# Patient Record
Sex: MALE | Race: OTHER | HISPANIC OR LATINO
[De-identification: names, ages, dates, MRNs, and addresses within clinical notes are randomized per-mention and may not be internally consistent; named-entity substitution may affect disease eponyms.]

---

## 2018-10-16 ENCOUNTER — LABORATORY RESULT (OUTPATIENT)
Age: 58
End: 2018-10-16

## 2018-10-16 ENCOUNTER — APPOINTMENT (OUTPATIENT)
Dept: HEMATOLOGY ONCOLOGY | Facility: CLINIC | Age: 58
End: 2018-10-16

## 2018-10-16 VITALS
TEMPERATURE: 96.3 F | WEIGHT: 170 LBS | BODY MASS INDEX: 25.76 KG/M2 | HEIGHT: 68 IN | DIASTOLIC BLOOD PRESSURE: 82 MMHG | SYSTOLIC BLOOD PRESSURE: 130 MMHG | RESPIRATION RATE: 14 BRPM | HEART RATE: 76 BPM

## 2018-10-16 RX ORDER — ADALIMUMAB 40MG/0.8ML
40 KIT SUBCUTANEOUS
Refills: 0 | Status: ACTIVE | COMMUNITY

## 2018-10-16 RX ORDER — OMEPRAZOLE 20 MG/1
20 CAPSULE, DELAYED RELEASE ORAL
Refills: 0 | Status: ACTIVE | COMMUNITY

## 2018-10-17 LAB
FERRITIN SERPL-MCNC: 10 NG/ML
HCT VFR BLD CALC: 36.8 %
HGB BLD-MCNC: 11.3 G/DL
IRON SATN MFR SERPL: 9 %
IRON SERPL-MCNC: 35 UG/DL
MCHC RBC-ENTMCNC: 25.3 PG
MCHC RBC-ENTMCNC: 30.7 G/DL
MCV RBC AUTO: 82.3 FL
PLATELET # BLD AUTO: 290 K/UL
PMV BLD: 9.2 FL
RBC # BLD: 4.47 M/UL
RBC # FLD: 15.5 %
RETICS # AUTO: 1.7 %
RETICS AGGREG/RBC NFR: 75.5 K/UL
TIBC SERPL-MCNC: 370 UG/DL
UIBC SERPL-MCNC: 335 UG/DL
WBC # FLD AUTO: 6.83 K/UL

## 2018-10-19 ENCOUNTER — APPOINTMENT (OUTPATIENT)
Dept: HEMATOLOGY ONCOLOGY | Facility: CLINIC | Age: 58
End: 2018-10-19

## 2018-10-19 VITALS
WEIGHT: 170 LBS | HEIGHT: 68 IN | TEMPERATURE: 97.3 F | RESPIRATION RATE: 14 BRPM | DIASTOLIC BLOOD PRESSURE: 82 MMHG | SYSTOLIC BLOOD PRESSURE: 122 MMHG | BODY MASS INDEX: 25.76 KG/M2 | HEART RATE: 82 BPM

## 2018-10-19 RX ORDER — UREA 10 %
140 (45 FE) LOTION (ML) TOPICAL
Qty: 30 | Refills: 0 | Status: DISCONTINUED | COMMUNITY
Start: 2018-08-27

## 2018-10-19 RX ORDER — ERGOCALCIFEROL 1.25 MG/1
1.25 MG CAPSULE, LIQUID FILLED ORAL
Qty: 4 | Refills: 0 | Status: ACTIVE | COMMUNITY
Start: 2018-08-27

## 2018-10-24 ENCOUNTER — APPOINTMENT (OUTPATIENT)
Dept: INFUSION THERAPY | Facility: CLINIC | Age: 58
End: 2018-10-24

## 2018-10-24 RX ORDER — IRON SUCROSE 20 MG/ML
200 INJECTION, SOLUTION INTRAVENOUS ONCE
Qty: 0 | Refills: 0 | Status: COMPLETED | OUTPATIENT
Start: 2018-10-24 | End: 2018-10-24

## 2018-10-24 RX ADMIN — IRON SUCROSE 220 MILLIGRAM(S): 20 INJECTION, SOLUTION INTRAVENOUS at 16:40

## 2018-10-31 ENCOUNTER — APPOINTMENT (OUTPATIENT)
Dept: INFUSION THERAPY | Facility: CLINIC | Age: 58
End: 2018-10-31

## 2018-10-31 RX ORDER — IRON SUCROSE 20 MG/ML
200 INJECTION, SOLUTION INTRAVENOUS ONCE
Qty: 0 | Refills: 0 | Status: COMPLETED | OUTPATIENT
Start: 2018-10-31 | End: 2018-10-31

## 2018-10-31 RX ADMIN — IRON SUCROSE 220 MILLIGRAM(S): 20 INJECTION, SOLUTION INTRAVENOUS at 16:06

## 2018-11-07 ENCOUNTER — APPOINTMENT (OUTPATIENT)
Dept: INFUSION THERAPY | Facility: CLINIC | Age: 58
End: 2018-11-07

## 2018-11-07 RX ORDER — IRON SUCROSE 20 MG/ML
200 INJECTION, SOLUTION INTRAVENOUS ONCE
Qty: 0 | Refills: 0 | Status: COMPLETED | OUTPATIENT
Start: 2018-11-07 | End: 2018-11-07

## 2018-11-07 RX ADMIN — IRON SUCROSE 220 MILLIGRAM(S): 20 INJECTION, SOLUTION INTRAVENOUS at 16:03

## 2018-11-14 ENCOUNTER — APPOINTMENT (OUTPATIENT)
Dept: INFUSION THERAPY | Facility: CLINIC | Age: 58
End: 2018-11-14

## 2018-11-14 RX ORDER — IRON SUCROSE 20 MG/ML
200 INJECTION, SOLUTION INTRAVENOUS ONCE
Qty: 0 | Refills: 0 | Status: COMPLETED | OUTPATIENT
Start: 2018-11-14 | End: 2018-11-14

## 2018-11-14 RX ADMIN — IRON SUCROSE 220 MILLIGRAM(S): 20 INJECTION, SOLUTION INTRAVENOUS at 08:30

## 2018-11-23 ENCOUNTER — APPOINTMENT (OUTPATIENT)
Dept: INFUSION THERAPY | Facility: CLINIC | Age: 58
End: 2018-11-23

## 2018-11-23 RX ORDER — IRON SUCROSE 20 MG/ML
200 INJECTION, SOLUTION INTRAVENOUS ONCE
Qty: 0 | Refills: 0 | Status: COMPLETED | OUTPATIENT
Start: 2018-11-23 | End: 2018-11-23

## 2018-11-23 RX ADMIN — IRON SUCROSE 220 MILLIGRAM(S): 20 INJECTION, SOLUTION INTRAVENOUS at 16:16

## 2019-01-02 ENCOUNTER — APPOINTMENT (OUTPATIENT)
Dept: HEMATOLOGY ONCOLOGY | Facility: CLINIC | Age: 59
End: 2019-01-02

## 2019-01-02 ENCOUNTER — LABORATORY RESULT (OUTPATIENT)
Age: 59
End: 2019-01-02

## 2019-01-02 LAB
HCT VFR BLD CALC: 41.6 %
HGB BLD-MCNC: 13 G/DL
MCHC RBC-ENTMCNC: 26.1 PG
MCHC RBC-ENTMCNC: 31.3 G/DL
MCV RBC AUTO: 83.4 FL
PLATELET # BLD AUTO: 324 K/UL
PMV BLD: 8.8 FL
RBC # BLD: 4.99 M/UL
RBC # FLD: 14.3 %
RETICS # AUTO: 1 %
RETICS AGGREG/RBC NFR: 47.4 K/UL
WBC # FLD AUTO: 8.74 K/UL

## 2019-01-03 LAB
FERRITIN SERPL-MCNC: 198 NG/ML
IRON SATN MFR SERPL: 23 %
IRON SERPL-MCNC: 57 UG/DL
TIBC SERPL-MCNC: 249 UG/DL
UIBC SERPL-MCNC: 192 UG/DL

## 2019-01-04 ENCOUNTER — OUTPATIENT (OUTPATIENT)
Dept: OUTPATIENT SERVICES | Facility: HOSPITAL | Age: 59
LOS: 1 days | Discharge: HOME | End: 2019-01-04

## 2019-01-04 ENCOUNTER — APPOINTMENT (OUTPATIENT)
Dept: HEMATOLOGY ONCOLOGY | Facility: CLINIC | Age: 59
End: 2019-01-04

## 2019-01-04 VITALS
TEMPERATURE: 97.8 F | HEART RATE: 90 BPM | HEIGHT: 68 IN | SYSTOLIC BLOOD PRESSURE: 129 MMHG | DIASTOLIC BLOOD PRESSURE: 78 MMHG | WEIGHT: 166 LBS | BODY MASS INDEX: 25.16 KG/M2 | RESPIRATION RATE: 14 BRPM

## 2019-01-04 DIAGNOSIS — D50.9 IRON DEFICIENCY ANEMIA, UNSPECIFIED: ICD-10-CM

## 2019-01-04 NOTE — ASSESSMENT
[FreeTextEntry1] : KAT due to crohn's disease\par asymptomatic; hg >11g; ferritin 10. S/p  venofer 200mg x5 infusions (completed 11.23.18) with improvement of hg to >12g and normalizaiton of ferritin and iron saturation. Advised not to take iron pills at this time. WIll reevaluate in 3 months : if hg and iron stores decrease, will restart iron infusions. \par rtc in 2-3 mos; labs before the visit\par

## 2019-01-04 NOTE — HISTORY OF PRESENT ILLNESS
[de-identified] : 59 yo man with Crohn's disease and ankylosing spondylitis has chronic iron deficiency anemia. His hg in Oct'2017 was 12g and in June 2018 11g; S/p 5 doses of venofer completed on 11.23.18.

## 2019-01-04 NOTE — CONSULT LETTER
[Dear  ___] : Dear  [unfilled], [Consult Letter:] : I had the pleasure of evaluating your patient, [unfilled]. [Please see my note below.] : Please see my note below. [Consult Closing:] : Thank you very much for allowing me to participate in the care of this patient.  If you have any questions, please do not hesitate to contact me. [Sincerely,] : Sincerely, [FreeTextEntry2] : Cornelius Gonzales MD\par [facility]\par [phone] PHONE\par  [FreeTextEntry3] : dEis Mckeon DO\par Attending Physician,\par Hematology/ Medical Oncology\par [phone] office\par \par

## 2019-04-15 ENCOUNTER — LABORATORY RESULT (OUTPATIENT)
Age: 59
End: 2019-04-15

## 2019-04-15 ENCOUNTER — APPOINTMENT (OUTPATIENT)
Dept: HEMATOLOGY ONCOLOGY | Facility: CLINIC | Age: 59
End: 2019-04-15

## 2019-04-17 ENCOUNTER — APPOINTMENT (OUTPATIENT)
Dept: HEMATOLOGY ONCOLOGY | Facility: CLINIC | Age: 59
End: 2019-04-17

## 2019-04-17 VITALS
BODY MASS INDEX: 25.76 KG/M2 | WEIGHT: 170 LBS | HEIGHT: 68 IN | SYSTOLIC BLOOD PRESSURE: 126 MMHG | RESPIRATION RATE: 18 BRPM | DIASTOLIC BLOOD PRESSURE: 84 MMHG | TEMPERATURE: 97.2 F | HEART RATE: 75 BPM

## 2019-04-17 LAB
FERRITIN SERPL-MCNC: 58 NG/ML
HCT VFR BLD CALC: 37.3 %
HGB BLD-MCNC: 11.9 G/DL
IRON SATN MFR SERPL: 18 %
IRON SERPL-MCNC: 50 UG/DL
MCHC RBC-ENTMCNC: 27.5 PG
MCHC RBC-ENTMCNC: 31.9 G/DL
MCV RBC AUTO: 86.1 FL
PLATELET # BLD AUTO: 273 K/UL
PMV BLD: 8.5 FL
RBC # BLD: 4.33 M/UL
RBC # FLD: 13.3 %
RETICS # AUTO: 1.6 %
RETICS AGGREG/RBC NFR: 70.1 K/UL
TIBC SERPL-MCNC: 272 UG/DL
UIBC SERPL-MCNC: 222 UG/DL
WBC # FLD AUTO: 5.6 K/UL

## 2019-04-17 NOTE — CONSULT LETTER
[Consult Letter:] : I had the pleasure of evaluating your patient, [unfilled]. [Dear  ___] : Dear  [unfilled], [Consult Closing:] : Thank you very much for allowing me to participate in the care of this patient.  If you have any questions, please do not hesitate to contact me. [Please see my note below.] : Please see my note below. [FreeTextEntry2] : Cornelius Gonzales MD\par [facility]\par [phone] PHONE\par  [Sincerely,] : Sincerely, [FreeTextEntry3] : Edis Mckeon DO\par Attending Physician,\par Hematology/ Medical Oncology\par [phone] office\par \par

## 2019-04-17 NOTE — HISTORY OF PRESENT ILLNESS
[de-identified] : 59 yo man with Crohn's disease and ankylosing spondylitis has chronic iron deficiency anemia. His hg in Oct'2017 was 12g and in June 2018 11g; S/p 5 doses of venofer completed on 11.23.18.

## 2019-04-17 NOTE — ASSESSMENT
[FreeTextEntry1] : KAT due to crohn's disease\par asymptomatic; hg >11g; ferritin 58 (April 2019). S/p  venofer 200mg x5 infusions (completed 11.23.18) with improvement of hg to >12g and normalizaiton of ferritin and iron saturation. Advised not to take iron pills at this time. WIll reevaluate in 2 months : if hg and iron stores decrease, will restart iron infusions. \par \par

## 2019-06-10 ENCOUNTER — APPOINTMENT (OUTPATIENT)
Dept: HEMATOLOGY ONCOLOGY | Facility: CLINIC | Age: 59
End: 2019-06-10

## 2019-06-10 ENCOUNTER — LABORATORY RESULT (OUTPATIENT)
Age: 59
End: 2019-06-10

## 2019-06-11 LAB
FERRITIN SERPL-MCNC: 34 NG/ML
HCT VFR BLD CALC: 38.8 %
HGB BLD-MCNC: 12 G/DL
IRON SATN MFR SERPL: 17 %
IRON SERPL-MCNC: 55 UG/DL
MCHC RBC-ENTMCNC: 26.8 PG
MCHC RBC-ENTMCNC: 30.9 G/DL
MCV RBC AUTO: 86.6 FL
PLATELET # BLD AUTO: 268 K/UL
PMV BLD: 9 FL
RBC # BLD: 4.48 M/UL
RBC # FLD: 14 %
TIBC SERPL-MCNC: 322 UG/DL
UIBC SERPL-MCNC: 267 UG/DL
WBC # FLD AUTO: 6.19 K/UL

## 2019-06-12 ENCOUNTER — OUTPATIENT (OUTPATIENT)
Dept: OUTPATIENT SERVICES | Facility: HOSPITAL | Age: 59
LOS: 1 days | Discharge: HOME | End: 2019-06-12

## 2019-06-12 ENCOUNTER — APPOINTMENT (OUTPATIENT)
Dept: HEMATOLOGY ONCOLOGY | Facility: CLINIC | Age: 59
End: 2019-06-12
Payer: COMMERCIAL

## 2019-06-12 VITALS
HEIGHT: 68 IN | RESPIRATION RATE: 16 BRPM | WEIGHT: 170 LBS | DIASTOLIC BLOOD PRESSURE: 86 MMHG | SYSTOLIC BLOOD PRESSURE: 126 MMHG | HEART RATE: 72 BPM | BODY MASS INDEX: 25.76 KG/M2 | TEMPERATURE: 96.1 F

## 2019-06-12 DIAGNOSIS — D50.9 IRON DEFICIENCY ANEMIA, UNSPECIFIED: ICD-10-CM

## 2019-06-12 PROCEDURE — 99213 OFFICE O/P EST LOW 20 MIN: CPT

## 2019-06-12 NOTE — REVIEW OF SYSTEMS
[Negative] : Allergic/Immunologic [Fatigue] : no fatigue [Chest Pain] : no chest pain [Shortness Of Breath] : no shortness of breath [FreeTextEntry7] : no bleeding

## 2019-06-12 NOTE — HISTORY OF PRESENT ILLNESS
[de-identified] : 6/12/19\par He is here for follow-up of KAT.  He completed venofer x 5 in 11/2018.  Most recent CBC is stable with Hgb 12.0g/dL, ferritin 34 and ironsat 17%.  He denies any CP, SOB, fatigue or bleeding.   [de-identified] : 57 yo man with Crohn's disease and ankylosing spondylitis has chronic iron deficiency anemia. His hg in Oct'2017 was 12g and in June 2018 11g; S/p 5 doses of venofer completed on 11.23.18.

## 2019-06-12 NOTE — CONSULT LETTER
[Dear  ___] : Dear  [unfilled], [Please see my note below.] : Please see my note below. [Consult Letter:] : I had the pleasure of evaluating your patient, [unfilled]. [Sincerely,] : Sincerely, [Consult Closing:] : Thank you very much for allowing me to participate in the care of this patient.  If you have any questions, please do not hesitate to contact me. [FreeTextEntry2] : Cornelius Gonzales MD\par [facility]\par [phone] PHONE\par  [FreeTextEntry3] : Edis Mckeon DO\par Attending Physician,\par Hematology/ Medical Oncology\par [phone] office\par \par

## 2019-06-12 NOTE — ASSESSMENT
[FreeTextEntry1] : #KAT due to crohn's disease; asymptomatic; hg >11g; ferritin 58 (April 2019). \par - Advised not to take iron pills at this time. \par - S/p venofer 200mg x5 infusions (completed 11.23.18) with improvement of hg to >12g and normalization of ferritin and iron saturation.\par - if hgb and iron stores decrease, will restart iron infusions. \par \par RTC in 2 months, CBC/iron studies/ferritin/retic count 2 days prior to visit\par

## 2019-07-22 ENCOUNTER — LABORATORY RESULT (OUTPATIENT)
Age: 59
End: 2019-07-22

## 2019-07-22 ENCOUNTER — APPOINTMENT (OUTPATIENT)
Dept: HEMATOLOGY ONCOLOGY | Facility: CLINIC | Age: 59
End: 2019-07-22

## 2019-07-22 LAB
HCT VFR BLD CALC: 39.1 %
HGB BLD-MCNC: 12.1 G/DL
MCHC RBC-ENTMCNC: 26.6 PG
MCHC RBC-ENTMCNC: 30.9 G/DL
MCV RBC AUTO: 85.9 FL
PLATELET # BLD AUTO: 266 K/UL
PMV BLD: 9.1 FL
RBC # BLD: 4.55 M/UL
RBC # FLD: 13.9 %
RETICS # AUTO: 1.8 %
RETICS AGGREG/RBC NFR: 81 K/UL
WBC # FLD AUTO: 6.92 K/UL

## 2019-07-23 LAB
FERRITIN SERPL-MCNC: 16 NG/ML
IRON SATN MFR SERPL: 11 %
IRON SERPL-MCNC: 40 UG/DL
TIBC SERPL-MCNC: 375 UG/DL
UIBC SERPL-MCNC: 335 UG/DL

## 2019-07-24 ENCOUNTER — APPOINTMENT (OUTPATIENT)
Dept: HEMATOLOGY ONCOLOGY | Facility: CLINIC | Age: 59
End: 2019-07-24
Payer: COMMERCIAL

## 2019-07-24 VITALS
BODY MASS INDEX: 25.61 KG/M2 | RESPIRATION RATE: 16 BRPM | WEIGHT: 169 LBS | SYSTOLIC BLOOD PRESSURE: 138 MMHG | HEART RATE: 75 BPM | TEMPERATURE: 97.2 F | HEIGHT: 68 IN | DIASTOLIC BLOOD PRESSURE: 80 MMHG

## 2019-07-24 PROCEDURE — 99213 OFFICE O/P EST LOW 20 MIN: CPT

## 2019-07-24 NOTE — HISTORY OF PRESENT ILLNESS
[de-identified] : 6/12/19\par He is here for follow-up of KAT.  He completed venofer x 5 in 11/2018.  Most recent CBC is stable with Hgb 12.0g/dL, ferritin 34 and ironsat 17%.  He denies any CP, SOB, fatigue or bleeding.  \par \par 7/24/19\par Patient is here for a follow-up visit of KAT.   [de-identified] : 57 yo man with Crohn's disease and ankylosing spondylitis has chronic iron deficiency anemia. His hg in Oct'2017 was 12g and in June 2018 11g; S/p 5 doses of venofer completed on 11.23.18.

## 2019-07-24 NOTE — ASSESSMENT
[FreeTextEntry1] : #KAT due to crohn's disease; asymptomatic; hg >11g; ferritin decreased; . \par - Advised not to take iron pills at this time. \par - S/p venofer 200mg x5 infusions (completed 11.23.18) with improvement of hg to >12g and normalization of ferritin and iron saturation. However, in view of the decrease of iron stores recommend to restart venofer weekly x5\par

## 2019-07-24 NOTE — CONSULT LETTER
[Consult Letter:] : I had the pleasure of evaluating your patient, [unfilled]. [Dear  ___] : Dear  [unfilled], [Please see my note below.] : Please see my note below. [Consult Closing:] : Thank you very much for allowing me to participate in the care of this patient.  If you have any questions, please do not hesitate to contact me. [Sincerely,] : Sincerely, [FreeTextEntry2] : Cornelius Gonzales MD\par [facility]\par [phone] PHONE\par  [FreeTextEntry3] : Edis Mckeon DO\par Attending Physician,\par Hematology/ Medical Oncology\par [phone] office\par \par

## 2019-07-24 NOTE — REVIEW OF SYSTEMS
[Negative] : Heme/Lymph [Fatigue] : no fatigue [Shortness Of Breath] : no shortness of breath [Chest Pain] : no chest pain [FreeTextEntry7] : no bleeding

## 2019-07-30 ENCOUNTER — APPOINTMENT (OUTPATIENT)
Dept: INFUSION THERAPY | Facility: CLINIC | Age: 59
End: 2019-07-30

## 2019-07-30 RX ORDER — IRON SUCROSE 20 MG/ML
200 INJECTION, SOLUTION INTRAVENOUS ONCE
Refills: 0 | Status: COMPLETED | OUTPATIENT
Start: 2019-07-30 | End: 2019-07-30

## 2019-07-30 RX ADMIN — IRON SUCROSE 200 MILLIGRAM(S): 20 INJECTION, SOLUTION INTRAVENOUS at 15:55

## 2019-07-30 RX ADMIN — IRON SUCROSE 220 MILLIGRAM(S): 20 INJECTION, SOLUTION INTRAVENOUS at 15:25

## 2019-08-05 ENCOUNTER — APPOINTMENT (OUTPATIENT)
Dept: INFUSION THERAPY | Facility: CLINIC | Age: 59
End: 2019-08-05

## 2019-08-05 RX ORDER — IRON SUCROSE 20 MG/ML
200 INJECTION, SOLUTION INTRAVENOUS ONCE
Refills: 0 | Status: COMPLETED | OUTPATIENT
Start: 2019-08-05 | End: 2019-08-05

## 2019-08-05 RX ADMIN — IRON SUCROSE 220 MILLIGRAM(S): 20 INJECTION, SOLUTION INTRAVENOUS at 16:27

## 2019-08-12 ENCOUNTER — APPOINTMENT (OUTPATIENT)
Dept: INFUSION THERAPY | Facility: CLINIC | Age: 59
End: 2019-08-12

## 2019-08-12 RX ORDER — IRON SUCROSE 20 MG/ML
200 INJECTION, SOLUTION INTRAVENOUS ONCE
Refills: 0 | Status: COMPLETED | OUTPATIENT
Start: 2019-08-12 | End: 2019-08-12

## 2019-08-12 RX ADMIN — IRON SUCROSE 220 MILLIGRAM(S): 20 INJECTION, SOLUTION INTRAVENOUS at 15:25

## 2019-08-12 RX ADMIN — IRON SUCROSE 200 MILLIGRAM(S): 20 INJECTION, SOLUTION INTRAVENOUS at 15:55

## 2019-08-19 ENCOUNTER — APPOINTMENT (OUTPATIENT)
Dept: INFUSION THERAPY | Facility: CLINIC | Age: 59
End: 2019-08-19

## 2019-08-19 RX ORDER — IRON SUCROSE 20 MG/ML
200 INJECTION, SOLUTION INTRAVENOUS ONCE
Refills: 0 | Status: COMPLETED | OUTPATIENT
Start: 2019-08-19 | End: 2019-08-19

## 2019-08-19 RX ADMIN — IRON SUCROSE 220 MILLIGRAM(S): 20 INJECTION, SOLUTION INTRAVENOUS at 15:25

## 2019-08-19 RX ADMIN — IRON SUCROSE 200 MILLIGRAM(S): 20 INJECTION, SOLUTION INTRAVENOUS at 16:10

## 2019-08-26 ENCOUNTER — APPOINTMENT (OUTPATIENT)
Dept: INFUSION THERAPY | Facility: CLINIC | Age: 59
End: 2019-08-26

## 2019-08-26 RX ORDER — IRON SUCROSE 20 MG/ML
200 INJECTION, SOLUTION INTRAVENOUS ONCE
Refills: 0 | Status: DISCONTINUED | OUTPATIENT
Start: 2019-08-26 | End: 2020-01-03

## 2019-09-26 ENCOUNTER — OUTPATIENT (OUTPATIENT)
Dept: OUTPATIENT SERVICES | Facility: HOSPITAL | Age: 59
LOS: 1 days | Discharge: HOME | End: 2019-09-26

## 2019-09-26 ENCOUNTER — LABORATORY RESULT (OUTPATIENT)
Age: 59
End: 2019-09-26

## 2019-09-26 ENCOUNTER — APPOINTMENT (OUTPATIENT)
Dept: HEMATOLOGY ONCOLOGY | Facility: CLINIC | Age: 59
End: 2019-09-26

## 2019-09-26 DIAGNOSIS — Z00.00 ENCOUNTER FOR GENERAL ADULT MEDICAL EXAMINATION W/OUT ABNORMAL FINDINGS: ICD-10-CM

## 2019-09-26 DIAGNOSIS — D50.9 IRON DEFICIENCY ANEMIA, UNSPECIFIED: ICD-10-CM

## 2019-09-27 LAB
FERRITIN SERPL-MCNC: 108 NG/ML
HCT VFR BLD CALC: 39.9 %
HGB BLD-MCNC: 12.8 G/DL
IRON SATN MFR SERPL: 24 %
IRON SERPL-MCNC: 62 UG/DL
MCHC RBC-ENTMCNC: 27.5 PG
MCHC RBC-ENTMCNC: 32.1 G/DL
MCV RBC AUTO: 85.8 FL
PLATELET # BLD AUTO: 208 K/UL
PMV BLD: 8.5 FL
RBC # BLD: 4.65 M/UL
RBC # FLD: 13.5 %
RETICS # AUTO: 1.2 %
RETICS AGGREG/RBC NFR: 54.9 K/UL
TIBC SERPL-MCNC: 260 UG/DL
UIBC SERPL-MCNC: 198 UG/DL
WBC # FLD AUTO: 5.41 K/UL

## 2019-10-01 ENCOUNTER — APPOINTMENT (OUTPATIENT)
Dept: HEMATOLOGY ONCOLOGY | Facility: CLINIC | Age: 59
End: 2019-10-01

## 2019-11-01 ENCOUNTER — APPOINTMENT (OUTPATIENT)
Dept: HEMATOLOGY ONCOLOGY | Facility: CLINIC | Age: 59
End: 2019-11-01
Payer: COMMERCIAL

## 2019-11-01 VITALS
DIASTOLIC BLOOD PRESSURE: 77 MMHG | RESPIRATION RATE: 16 BRPM | TEMPERATURE: 96.3 F | HEIGHT: 68 IN | HEART RATE: 89 BPM | BODY MASS INDEX: 26.22 KG/M2 | SYSTOLIC BLOOD PRESSURE: 136 MMHG | WEIGHT: 173 LBS

## 2019-11-01 PROCEDURE — 99213 OFFICE O/P EST LOW 20 MIN: CPT

## 2019-11-01 NOTE — ASSESSMENT
[FreeTextEntry1] : #KAT due to crohn's disease; asymptomatic; hg >11g; ferritin decreased; . \par - Advised not to take iron pills at this time. \par - S/p venofer 200mg x5 infusions (completed 11.23.18) and second course finished in August 2019  with improvement of hg to >12g and normalization of ferritin and iron saturation. \par \par rtc/labs in 4 mos

## 2019-11-01 NOTE — HISTORY OF PRESENT ILLNESS
[de-identified] : 6/12/19\par He is here for follow-up of KAT.  He completed venofer x 5 in 11/2018.  Most recent CBC is stable with Hgb 12.0g/dL, ferritin 34 and ironsat 17%.  He denies any CP, SOB, fatigue or bleeding.  \par \par 7/24/19\par Patient is here for a follow-up visit of KAT.   [de-identified] : 57 yo man with Crohn's disease and ankylosing spondylitis has chronic iron deficiency anemia. His hg in Oct'2017 was 12g and in June 2018 11g; S/p 5 doses of venofer completed on 11.23.18.

## 2019-11-01 NOTE — CONSULT LETTER
[Dear  ___] : Dear  [unfilled], [Consult Letter:] : I had the pleasure of evaluating your patient, [unfilled]. [Please see my note below.] : Please see my note below. [Consult Closing:] : Thank you very much for allowing me to participate in the care of this patient.  If you have any questions, please do not hesitate to contact me. [Sincerely,] : Sincerely, [FreeTextEntry2] : Cornelius Gonzales MD\par [facility]\par [phone] PHONE\par  [FreeTextEntry3] : Edis Mckeon DO\par Attending Physician,\par Hematology/ Medical Oncology\par [phone] office\par \par

## 2019-12-27 ENCOUNTER — LABORATORY RESULT (OUTPATIENT)
Age: 59
End: 2019-12-27

## 2019-12-27 ENCOUNTER — APPOINTMENT (OUTPATIENT)
Dept: HEMATOLOGY ONCOLOGY | Facility: CLINIC | Age: 59
End: 2019-12-27

## 2019-12-27 LAB
HCT VFR BLD CALC: 37.2 %
HGB BLD-MCNC: 12 G/DL
MCHC RBC-ENTMCNC: 27.7 PG
MCHC RBC-ENTMCNC: 32.3 G/DL
MCV RBC AUTO: 85.9 FL
PLATELET # BLD AUTO: 245 K/UL
PMV BLD: 8.6 FL
RBC # BLD: 4.33 M/UL
RBC # FLD: 13.2 %
WBC # FLD AUTO: 7.32 K/UL

## 2019-12-30 LAB
FERRITIN SERPL-MCNC: 127 NG/ML
IRON SATN MFR SERPL: 17 %
IRON SERPL-MCNC: 46 UG/DL
TIBC SERPL-MCNC: 275 UG/DL
UIBC SERPL-MCNC: 229 UG/DL

## 2020-01-14 ENCOUNTER — APPOINTMENT (OUTPATIENT)
Dept: HEMATOLOGY ONCOLOGY | Facility: CLINIC | Age: 60
End: 2020-01-14
Payer: COMMERCIAL

## 2020-01-14 ENCOUNTER — OUTPATIENT (OUTPATIENT)
Dept: OUTPATIENT SERVICES | Facility: HOSPITAL | Age: 60
LOS: 1 days | Discharge: HOME | End: 2020-01-14

## 2020-01-14 VITALS
TEMPERATURE: 97 F | BODY MASS INDEX: 26.67 KG/M2 | WEIGHT: 176 LBS | HEIGHT: 68 IN | DIASTOLIC BLOOD PRESSURE: 73 MMHG | HEART RATE: 71 BPM | SYSTOLIC BLOOD PRESSURE: 130 MMHG | RESPIRATION RATE: 16 BRPM

## 2020-01-14 DIAGNOSIS — D50.9 IRON DEFICIENCY ANEMIA, UNSPECIFIED: ICD-10-CM

## 2020-01-14 PROCEDURE — 99213 OFFICE O/P EST LOW 20 MIN: CPT

## 2020-01-14 NOTE — ASSESSMENT
[FreeTextEntry1] : #KAT due to crohn's disease; asymptomatic; hg >11g; ferritin decreased; . \par - Advised not to take iron pills at this time. \par - S/p venofer 200mg x5 infusions (completed 11.23.18) and second course finished in August 2019  with improvement of hg to >12g and normalization of ferritin and iron saturation. normal levels\par \par has ulcerative colitis; needs to see gi to schedule surveillance colonoscopy\par \par rtc/labs in 4 mos

## 2020-01-14 NOTE — REVIEW OF SYSTEMS
[Negative] : Heme/Lymph [Fatigue] : no fatigue [Chest Pain] : no chest pain [Shortness Of Breath] : no shortness of breath [FreeTextEntry7] : no bleeding

## 2020-01-14 NOTE — HISTORY OF PRESENT ILLNESS
[de-identified] : 6/12/19\par He is here for follow-up of KAT.  He completed venofer x 5 in 11/2018.  Most recent CBC is stable with Hgb 12.0g/dL, ferritin 34 and ironsat 17%.  He denies any CP, SOB, fatigue or bleeding.  \par \par 7/24/19\par Patient is here for a follow-up visit of KAT.   [de-identified] : 57 yo man with Crohn's disease and ankylosing spondylitis has chronic iron deficiency anemia. His hg in Oct'2017 was 12g and in June 2018 11g; S/p 5 doses of venofer completed on 11.23.18.

## 2020-01-14 NOTE — CONSULT LETTER
[Consult Letter:] : I had the pleasure of evaluating your patient, [unfilled]. [Dear  ___] : Dear  [unfilled], [Sincerely,] : Sincerely, [Consult Closing:] : Thank you very much for allowing me to participate in the care of this patient.  If you have any questions, please do not hesitate to contact me. [Please see my note below.] : Please see my note below. [DrSekou  ___] : Dr. BECERRA [FreeTextEntry3] : Edis Mckeon DO\par Attending Physician,\par Hematology/ Medical Oncology\par [phone] office\par \par  [FreeTextEntry2] : Cornelius Gonzales MD\par [facility]\par [phone] PHONE\par

## 2020-05-11 ENCOUNTER — APPOINTMENT (OUTPATIENT)
Dept: HEMATOLOGY ONCOLOGY | Facility: CLINIC | Age: 60
End: 2020-05-11

## 2020-06-08 ENCOUNTER — LABORATORY RESULT (OUTPATIENT)
Age: 60
End: 2020-06-08

## 2020-06-08 ENCOUNTER — APPOINTMENT (OUTPATIENT)
Dept: HEMATOLOGY ONCOLOGY | Facility: CLINIC | Age: 60
End: 2020-06-08

## 2020-06-16 ENCOUNTER — APPOINTMENT (OUTPATIENT)
Dept: HEMATOLOGY ONCOLOGY | Facility: CLINIC | Age: 60
End: 2020-06-16
Payer: COMMERCIAL

## 2020-06-16 LAB
ANION GAP SERPL CALC-SCNC: 12 MMOL/L
BUN SERPL-MCNC: 12 MG/DL
CALCIUM SERPL-MCNC: 8.3 MG/DL
CHLORIDE SERPL-SCNC: 102 MMOL/L
CO2 SERPL-SCNC: 30 MMOL/L
CREAT SERPL-MCNC: 1 MG/DL
FERRITIN SERPL-MCNC: 277 NG/ML
GLUCOSE SERPL-MCNC: 101 MG/DL
HCT VFR BLD CALC: 34.8 %
HGB BLD-MCNC: 11 G/DL
IRON SATN MFR SERPL: 32 %
IRON SERPL-MCNC: 67 UG/DL
MCHC RBC-ENTMCNC: 27.2 PG
MCHC RBC-ENTMCNC: 31.6 G/DL
MCV RBC AUTO: 85.9 FL
PLATELET # BLD AUTO: 300 K/UL
PMV BLD: 8.6 FL
POTASSIUM SERPL-SCNC: 3.3 MMOL/L
RBC # BLD: 4.05 M/UL
RBC # FLD: 12.9 %
RETICS # AUTO: 1.2 %
RETICS AGGREG/RBC NFR: 47 K/UL
SODIUM SERPL-SCNC: 144 MMOL/L
TIBC SERPL-MCNC: 210 UG/DL
UIBC SERPL-MCNC: 143 UG/DL
WBC # FLD AUTO: 5.68 K/UL

## 2020-06-16 PROCEDURE — 99213 OFFICE O/P EST LOW 20 MIN: CPT | Mod: 95

## 2020-06-16 NOTE — CONSULT LETTER
[Dear  ___] : Dear  [unfilled], [Consult Letter:] : I had the pleasure of evaluating your patient, [unfilled]. [Please see my note below.] : Please see my note below. [Sincerely,] : Sincerely, [Consult Closing:] : Thank you very much for allowing me to participate in the care of this patient.  If you have any questions, please do not hesitate to contact me. [DrSekou  ___] : Dr. BECERRA [FreeTextEntry3] : Edis Mckeon DO\par Attending Physician,\par Hematology/ Medical Oncology\par [phone] office\par \par  [FreeTextEntry2] : Cornelius Gonzales MD\par [facility] Services\par [phone] PHONE\par

## 2020-06-16 NOTE — HISTORY OF PRESENT ILLNESS
[de-identified] : 6/12/19\par He is here for follow-up of KAT.  He completed venofer x 5 in 11/2018.  Most recent CBC is stable with Hgb 12.0g/dL, ferritin 34 and ironsat 17%.  He denies any CP, SOB, fatigue or bleeding.  \par \par 7/24/19\par Patient is here for a follow-up visit of KAT.   [de-identified] : 57 yo man with Crohn's disease and ankylosing spondylitis has chronic iron deficiency anemia. His hg in Oct'2017 was 12g and in June 2018 11g; S/p 5 doses of venofer completed on 11.23.18.

## 2020-06-16 NOTE — ASSESSMENT
[FreeTextEntry1] : #KAT due to crohn's disease; asymptomatic; hg >11g; ferritin decreased; . \par - Advised not to take iron pills at this time. \par - S/p venofer 200mg x5 infusions (completed 11.23.18) and second course finished in August 2019  with improvement of hg to >12g and normalization of ferritin and iron saturation. normal levels\par \par has ulcerative colitis; needs to see gi to schedule surveillance colonoscopy\par \par still has excellent iron stores and low normal hg of 11g; do not recommend iron at this time\par rt/labs in 3 mos

## 2020-09-08 ENCOUNTER — APPOINTMENT (OUTPATIENT)
Dept: HEMATOLOGY ONCOLOGY | Facility: CLINIC | Age: 60
End: 2020-09-08

## 2020-09-08 ENCOUNTER — LABORATORY RESULT (OUTPATIENT)
Age: 60
End: 2020-09-08

## 2020-09-08 ENCOUNTER — OUTPATIENT (OUTPATIENT)
Dept: OUTPATIENT SERVICES | Facility: HOSPITAL | Age: 60
LOS: 1 days | Discharge: HOME | End: 2020-09-08

## 2020-09-08 DIAGNOSIS — D50.9 IRON DEFICIENCY ANEMIA, UNSPECIFIED: ICD-10-CM

## 2020-09-15 ENCOUNTER — APPOINTMENT (OUTPATIENT)
Dept: HEMATOLOGY ONCOLOGY | Facility: CLINIC | Age: 60
End: 2020-09-15
Payer: COMMERCIAL

## 2020-09-15 LAB
ALBUMIN SERPL ELPH-MCNC: 3.7 G/DL
ALP BLD-CCNC: 60 U/L
ALT SERPL-CCNC: 10 U/L
ANION GAP SERPL CALC-SCNC: 9 MMOL/L
AST SERPL-CCNC: 18 U/L
BILIRUB DIRECT SERPL-MCNC: <0.2 MG/DL
BILIRUB INDIRECT SERPL-MCNC: >0.4 MG/DL
BILIRUB SERPL-MCNC: 0.6 MG/DL
BUN SERPL-MCNC: 11 MG/DL
CALCIUM SERPL-MCNC: 9 MG/DL
CHLORIDE SERPL-SCNC: 102 MMOL/L
CO2 SERPL-SCNC: 32 MMOL/L
CREAT SERPL-MCNC: 0.9 MG/DL
FERRITIN SERPL-MCNC: 85 NG/ML
GLUCOSE SERPL-MCNC: 94 MG/DL
HCT VFR BLD CALC: 42.8 %
HGB BLD-MCNC: 13.5 G/DL
IRON SATN MFR SERPL: 50 %
IRON SERPL-MCNC: 155 UG/DL
MCHC RBC-ENTMCNC: 27.3 PG
MCHC RBC-ENTMCNC: 31.5 G/DL
MCV RBC AUTO: 86.6 FL
PLATELET # BLD AUTO: 269 K/UL
PMV BLD: 8.7 FL
POTASSIUM SERPL-SCNC: 3.9 MMOL/L
PROT SERPL-MCNC: 6.7 G/DL
RBC # BLD: 4.94 M/UL
RBC # FLD: 13.6 %
SODIUM SERPL-SCNC: 143 MMOL/L
TIBC SERPL-MCNC: 309 UG/DL
UIBC SERPL-MCNC: 154 UG/DL
WBC # FLD AUTO: 5.54 K/UL

## 2020-09-15 PROCEDURE — 99213 OFFICE O/P EST LOW 20 MIN: CPT | Mod: 95

## 2020-09-15 NOTE — REASON FOR VISIT
[Follow-Up Visit] : a follow-up [Home] : at home, [unfilled] , at the time of the visit. [Medical Office: ([facility])___] : at the medical office located in  [Verbal consent obtained from patient] : the patient, [unfilled] [FreeTextEntry2] : KAT

## 2020-09-15 NOTE — HISTORY OF PRESENT ILLNESS
[de-identified] : 6/12/19\par He is here for follow-up of KAT.  He completed venofer x 5 in 11/2018.  Most recent CBC is stable with Hgb 12.0g/dL, ferritin 34 and ironsat 17%.  He denies any CP, SOB, fatigue or bleeding.  \par \par 7/24/19\par Patient is here for a follow-up visit of KAT.   [de-identified] : 57 yo man with Crohn's disease and ankylosing spondylitis has chronic iron deficiency anemia. His hg in Oct'2017 was 12g and in June 2018 11g; S/p 5 doses of venofer completed on 11.23.18.

## 2020-09-15 NOTE — CONSULT LETTER
[Dear  ___] : Dear  [unfilled], [Consult Letter:] : I had the pleasure of evaluating your patient, [unfilled]. [Please see my note below.] : Please see my note below. [Consult Closing:] : Thank you very much for allowing me to participate in the care of this patient.  If you have any questions, please do not hesitate to contact me. [Sincerely,] : Sincerely, [DrSekou  ___] : Dr. BECERRA [FreeTextEntry2] : Cornelius Gonzales MD\par [facility]\par [phone] PHONE\par  [FreeTextEntry3] : Edis Mckeon DO\par Attending Physician,\par Hematology/ Medical Oncology\par [phone] office\par \par

## 2020-09-15 NOTE — ASSESSMENT
[FreeTextEntry1] : #KAT due to crohn's disease; asymptomatic; hg >11g; ferritin decreased; . \par - Advised not to take iron pills at this time. \par - S/p venofer 200mg x5 infusions (completed 11.23.18) and second course finished in August 2019  with improvement of hg to >12g and normalization of ferritin and iron saturation. normal levels\par \par has ulcerative colitis; needs to see gi to schedule surveillance colonoscopy\par \par still has excellent iron stores and low normal hg of 13,5g; do not recommend iron at this time\par rt/labs in 3 mos

## 2020-12-07 ENCOUNTER — APPOINTMENT (OUTPATIENT)
Dept: HEMATOLOGY ONCOLOGY | Facility: CLINIC | Age: 60
End: 2020-12-07

## 2020-12-16 ENCOUNTER — APPOINTMENT (OUTPATIENT)
Dept: HEMATOLOGY ONCOLOGY | Facility: CLINIC | Age: 60
End: 2020-12-16

## 2020-12-18 ENCOUNTER — LABORATORY RESULT (OUTPATIENT)
Age: 60
End: 2020-12-18

## 2020-12-18 ENCOUNTER — APPOINTMENT (OUTPATIENT)
Dept: HEMATOLOGY ONCOLOGY | Facility: CLINIC | Age: 60
End: 2020-12-18

## 2020-12-18 ENCOUNTER — OUTPATIENT (OUTPATIENT)
Dept: OUTPATIENT SERVICES | Facility: HOSPITAL | Age: 60
LOS: 1 days | Discharge: HOME | End: 2020-12-18

## 2020-12-18 LAB
HCT VFR BLD CALC: 42.6 %
HGB BLD-MCNC: 13.7 G/DL
IRON SATN MFR SERPL: 31 %
IRON SERPL-MCNC: 95 UG/DL
MCHC RBC-ENTMCNC: 27.7 PG
MCHC RBC-ENTMCNC: 32.2 G/DL
MCV RBC AUTO: 86.1 FL
PLATELET # BLD AUTO: 247 K/UL
PMV BLD: 8.9 FL
RBC # BLD: 4.95 M/UL
RBC # FLD: 13 %
TIBC SERPL-MCNC: 309 UG/DL
UIBC SERPL-MCNC: 214 UG/DL
WBC # FLD AUTO: 7.22 K/UL

## 2020-12-19 LAB — FERRITIN SERPL-MCNC: 61 NG/ML

## 2020-12-23 ENCOUNTER — APPOINTMENT (OUTPATIENT)
Dept: HEMATOLOGY ONCOLOGY | Facility: CLINIC | Age: 60
End: 2020-12-23
Payer: COMMERCIAL

## 2020-12-23 PROCEDURE — 99213 OFFICE O/P EST LOW 20 MIN: CPT | Mod: 95

## 2020-12-23 NOTE — CONSULT LETTER
[Dear  ___] : Dear  [unfilled], [Consult Letter:] : I had the pleasure of evaluating your patient, [unfilled]. [Please see my note below.] : Please see my note below. [Consult Closing:] : Thank you very much for allowing me to participate in the care of this patient.  If you have any questions, please do not hesitate to contact me. [Sincerely,] : Sincerely, [DrSekou  ___] : Dr. BECERRA [FreeTextEntry2] : Cornelius Gonzales MD\par [facility]\par [phone] PHONE\par  [FreeTextEntry3] : Edis Mckeon DO\par Attending Physician,\par Hematology/ Medical Oncology\par [phone] office\par \par

## 2020-12-23 NOTE — ASSESSMENT
[FreeTextEntry1] : #KAT due to crohn's disease; asymptomatic; hg >11g; ferritin decreased; . \par - Advised not to take iron pills at this time. \par - S/p venofer 200mg x5 infusions (completed 11.23.18) and second course finished in August 2019  with improvement of hg to >12g and normalization of ferritin and iron saturation. normal levels\par \par has ulcerative colitis; needs to see gi to schedule surveillance colonoscopy\par \par has excellent iron stores and low normal hg ; do not recommend iron at this time\par rt/labs in 3 mos

## 2020-12-23 NOTE — REASON FOR VISIT
[Follow-Up Visit] : a follow-up [Home] : at home, [unfilled] , at the time of the visit. [Medical Office: ([facility])___] : at the medical office located in  [Verbal consent obtained from patient] : the patient, [unfilled] [FreeTextEntry2] : KAT

## 2020-12-23 NOTE — HISTORY OF PRESENT ILLNESS
[de-identified] : 6/12/19\par He is here for follow-up of KAT.  He completed venofer x 5 in 11/2018.  Most recent CBC is stable with Hgb 12.0g/dL, ferritin 34 and ironsat 17%.  He denies any CP, SOB, fatigue or bleeding.  \par \par 7/24/19\par Patient is here for a follow-up visit of KAT.   [de-identified] : 59 yo man with Crohn's disease and ankylosing spondylitis has chronic iron deficiency anemia. His hg in Oct'2017 was 12g and in June 2018 11g; S/p 5 doses of venofer completed on 11.23.18.

## 2020-12-28 DIAGNOSIS — D50.9 IRON DEFICIENCY ANEMIA, UNSPECIFIED: ICD-10-CM

## 2021-04-12 ENCOUNTER — LABORATORY RESULT (OUTPATIENT)
Age: 61
End: 2021-04-12

## 2021-04-12 ENCOUNTER — APPOINTMENT (OUTPATIENT)
Dept: HEMATOLOGY ONCOLOGY | Facility: CLINIC | Age: 61
End: 2021-04-12

## 2021-04-20 LAB
FERRITIN SERPL-MCNC: 65 NG/ML
HCT VFR BLD CALC: 40.7 %
HGB BLD-MCNC: 13.3 G/DL
IRON SATN MFR SERPL: 24 %
IRON SERPL-MCNC: 75 UG/DL
MCHC RBC-ENTMCNC: 27.8 PG
MCHC RBC-ENTMCNC: 32.7 G/DL
MCV RBC AUTO: 85 FL
PLATELET # BLD AUTO: 298 K/UL
PMV BLD: 9 FL
RBC # BLD: 4.79 M/UL
RBC # FLD: 12.8 %
TIBC SERPL-MCNC: 314 UG/DL
UIBC SERPL-MCNC: 239 UG/DL
WBC # FLD AUTO: 5.93 K/UL

## 2021-04-21 ENCOUNTER — APPOINTMENT (OUTPATIENT)
Dept: HEMATOLOGY ONCOLOGY | Facility: CLINIC | Age: 61
End: 2021-04-21
Payer: COMMERCIAL

## 2021-04-21 ENCOUNTER — OUTPATIENT (OUTPATIENT)
Dept: OUTPATIENT SERVICES | Facility: HOSPITAL | Age: 61
LOS: 1 days | Discharge: HOME | End: 2021-04-21

## 2021-04-21 VITALS
RESPIRATION RATE: 16 BRPM | SYSTOLIC BLOOD PRESSURE: 127 MMHG | DIASTOLIC BLOOD PRESSURE: 86 MMHG | HEIGHT: 68 IN | WEIGHT: 173 LBS | HEART RATE: 80 BPM | BODY MASS INDEX: 26.22 KG/M2

## 2021-04-21 PROCEDURE — 99212 OFFICE O/P EST SF 10 MIN: CPT

## 2021-04-21 NOTE — ASSESSMENT
[FreeTextEntry1] : #KAT due to crohn's disease; asymptomatic; hg >11g; ferritin decreased; . \par - Advised not to take iron pills at this time. \par - S/p venofer 200mg x5 infusions (completed 11.23.18 + August 2019) with improvement of hg to >12g and normalization of ferritin and iron saturation\par - ****needs to see gi to schedule surveillance colonoscopy in light of ulcerative colitis\par - if iron levels drop again, will plan to replete with IV venofer\par \par RTC in 4 months with CBC, BMP, LFTs, CEA, iron studies, ferritin 2 days prior

## 2021-04-21 NOTE — HISTORY OF PRESENT ILLNESS
[de-identified] : 6/12/19\par He is here for follow-up of KAT.  He completed venofer x 5 in 11/2018.  Most recent CBC is stable with Hgb 12.0g/dL, ferritin 34 and ironsat 17%.  He denies any CP, SOB, fatigue or bleeding.  \par \par 7/24/19\par Patient is here for a follow-up visit of KAT.  \par \par 4/21/21\par Patient is here for a follow-up visit for KAT.  He is feeling well with no new complaints.  Reviewed most recent CBC which shows stable anemia at 13.3g/dL with normal iron stores.  He does not take oral iron.  Patient denies fever, chills or bleeding.  He states he follows with GI for management Crohns disease, but it is stable so he has not seen them in a while.   [de-identified] : 57 yo man with Crohn's disease and ankylosing spondylitis has chronic iron deficiency anemia. His hg in Oct'2017 was 12g and in June 2018 11g; S/p 5 doses of venofer completed on 11.23.18.

## 2021-04-21 NOTE — CONSULT LETTER
[Dear  ___] : Dear  [unfilled], [Consult Letter:] : I had the pleasure of evaluating your patient, [unfilled]. [Please see my note below.] : Please see my note below. [Consult Closing:] : Thank you very much for allowing me to participate in the care of this patient.  If you have any questions, please do not hesitate to contact me. [Sincerely,] : Sincerely, [DrSekou  ___] : Dr. BECERRA [FreeTextEntry2] : Cornelius Gonzales MD\par [facility]\par [phone] PHONE\par  [FreeTextEntry3] : Edis Mckeon DO\par Attending Physician,\par Hematology/ Medical Oncology\par [phone] office\par \par

## 2021-08-23 ENCOUNTER — LABORATORY RESULT (OUTPATIENT)
Age: 61
End: 2021-08-23

## 2021-08-23 ENCOUNTER — APPOINTMENT (OUTPATIENT)
Dept: HEMATOLOGY ONCOLOGY | Facility: CLINIC | Age: 61
End: 2021-08-23

## 2021-08-23 LAB
HCT VFR BLD CALC: 41.4 %
HGB BLD-MCNC: 13.3 G/DL
MCHC RBC-ENTMCNC: 27.6 PG
MCHC RBC-ENTMCNC: 32.1 G/DL
MCV RBC AUTO: 85.9 FL
PLATELET # BLD AUTO: 256 K/UL
PMV BLD: 8.9 FL
RBC # BLD: 4.82 M/UL
RBC # FLD: 12.9 %
WBC # FLD AUTO: 6.44 K/UL

## 2021-08-24 LAB
ALBUMIN SERPL ELPH-MCNC: 3.5 G/DL
ALP BLD-CCNC: 76 U/L
ALT SERPL-CCNC: 9 U/L
ANION GAP SERPL CALC-SCNC: 12 MMOL/L
AST SERPL-CCNC: 14 U/L
BILIRUB DIRECT SERPL-MCNC: <0.2 MG/DL
BILIRUB INDIRECT SERPL-MCNC: >0.3 MG/DL
BILIRUB SERPL-MCNC: 0.5 MG/DL
BUN SERPL-MCNC: 11 MG/DL
CALCIUM SERPL-MCNC: 8.9 MG/DL
CEA SERPL-MCNC: 1.8 NG/ML
CHLORIDE SERPL-SCNC: 104 MMOL/L
CO2 SERPL-SCNC: 28 MMOL/L
CREAT SERPL-MCNC: 0.9 MG/DL
FERRITIN SERPL-MCNC: 37 NG/ML
GLUCOSE SERPL-MCNC: 104 MG/DL
IRON SATN MFR SERPL: 32 %
IRON SERPL-MCNC: 97 UG/DL
POTASSIUM SERPL-SCNC: 4.2 MMOL/L
PROT SERPL-MCNC: 6.4 G/DL
SODIUM SERPL-SCNC: 144 MMOL/L
TIBC SERPL-MCNC: 302 UG/DL
UIBC SERPL-MCNC: 205 UG/DL

## 2021-08-25 ENCOUNTER — APPOINTMENT (OUTPATIENT)
Dept: HEMATOLOGY ONCOLOGY | Facility: CLINIC | Age: 61
End: 2021-08-25
Payer: COMMERCIAL

## 2021-08-25 VITALS
DIASTOLIC BLOOD PRESSURE: 90 MMHG | BODY MASS INDEX: 25.31 KG/M2 | WEIGHT: 167 LBS | SYSTOLIC BLOOD PRESSURE: 143 MMHG | HEART RATE: 64 BPM | HEIGHT: 68 IN | TEMPERATURE: 97.2 F | RESPIRATION RATE: 16 BRPM

## 2021-08-25 PROCEDURE — 99213 OFFICE O/P EST LOW 20 MIN: CPT

## 2021-08-25 NOTE — CONSULT LETTER
[Dear  ___] : Dear  [unfilled], [Consult Letter:] : I had the pleasure of evaluating your patient, [unfilled]. [Please see my note below.] : Please see my note below. [Consult Closing:] : Thank you very much for allowing me to participate in the care of this patient.  If you have any questions, please do not hesitate to contact me. [Sincerely,] : Sincerely, [DrSekou  ___] : Dr. BECERRA [FreeTextEntry2] : Cornelius Gonzales MD\par [facility] Services\par [phone] PHONE\par  [FreeTextEntry3] : Edis Mckeon DO\par Attending Physician,\par Hematology/ Medical Oncology\par [phone] office\par \par

## 2021-08-25 NOTE — ASSESSMENT
[FreeTextEntry1] : #KAT due to crohn's disease; asymptomatic; hg >11g; ferritin decreased; . \par - Advised not to take iron pills at this time. \par - S/p venofer 200mg x5 infusions (completed 11.23.18 + August 2019) with improvement of hg to >12g and normalization of ferritin and iron saturation\par - ****saw gi to schedule surveillance colonoscopy in light of ulcerative colitis\par - if iron levels drop again, will plan to replete with IV venofer\par \par RTC in 3 months with CBC, BMP, LFTs, CEA, iron studies, ferritin 2 days prior

## 2021-08-25 NOTE — HISTORY OF PRESENT ILLNESS
[de-identified] : 6/12/19\par He is here for follow-up of KAT.  He completed venofer x 5 in 11/2018.  Most recent CBC is stable with Hgb 12.0g/dL, ferritin 34 and ironsat 17%.  He denies any CP, SOB, fatigue or bleeding.  \par \par 7/24/19\par Patient is here for a follow-up visit of KAT.  \par \par 4/21/21\par Patient is here for a follow-up visit for KAT.  He is feeling well with no new complaints.  Reviewed most recent CBC which shows stable anemia at 13.3g/dL with normal iron stores.  He does not take oral iron.  Patient denies fever, chills or bleeding.  He states he follows with GI for management Crohns disease, but it is stable so he has not seen them in a while.   [de-identified] : \par

## 2021-11-22 ENCOUNTER — APPOINTMENT (OUTPATIENT)
Dept: HEMATOLOGY ONCOLOGY | Facility: CLINIC | Age: 61
End: 2021-11-22

## 2021-11-22 ENCOUNTER — LABORATORY RESULT (OUTPATIENT)
Age: 61
End: 2021-11-22

## 2021-11-22 LAB
ALBUMIN SERPL ELPH-MCNC: 3.7 G/DL
ALP BLD-CCNC: 66 U/L
ALT SERPL-CCNC: 13 U/L
ANION GAP SERPL CALC-SCNC: 15 MMOL/L
AST SERPL-CCNC: 19 U/L
BILIRUB DIRECT SERPL-MCNC: 0.2 MG/DL
BILIRUB INDIRECT SERPL-MCNC: 0.3 MG/DL
BILIRUB SERPL-MCNC: 0.5 MG/DL
BUN SERPL-MCNC: 14 MG/DL
CALCIUM SERPL-MCNC: 8.8 MG/DL
CHLORIDE SERPL-SCNC: 105 MMOL/L
CO2 SERPL-SCNC: 23 MMOL/L
CREAT SERPL-MCNC: 0.9 MG/DL
GLUCOSE SERPL-MCNC: 124 MG/DL
HCT VFR BLD CALC: 42.1 %
HGB BLD-MCNC: 13.7 G/DL
IRON SATN MFR SERPL: 39 %
IRON SERPL-MCNC: 115 UG/DL
MCHC RBC-ENTMCNC: 28.2 PG
MCHC RBC-ENTMCNC: 32.5 G/DL
MCV RBC AUTO: 86.8 FL
PLATELET # BLD AUTO: 281 K/UL
PMV BLD: 8.4 FL
POTASSIUM SERPL-SCNC: 4 MMOL/L
PROT SERPL-MCNC: 6.8 G/DL
RBC # BLD: 4.85 M/UL
RBC # FLD: 13.2 %
SODIUM SERPL-SCNC: 143 MMOL/L
TIBC SERPL-MCNC: 296 UG/DL
UIBC SERPL-MCNC: 181 UG/DL
WBC # FLD AUTO: 6.48 K/UL

## 2021-11-23 ENCOUNTER — APPOINTMENT (OUTPATIENT)
Dept: HEMATOLOGY ONCOLOGY | Facility: CLINIC | Age: 61
End: 2021-11-23
Payer: COMMERCIAL

## 2021-11-23 ENCOUNTER — OUTPATIENT (OUTPATIENT)
Dept: OUTPATIENT SERVICES | Facility: HOSPITAL | Age: 61
LOS: 1 days | Discharge: HOME | End: 2021-11-23

## 2021-11-23 VITALS
BODY MASS INDEX: 26.37 KG/M2 | TEMPERATURE: 96.8 F | DIASTOLIC BLOOD PRESSURE: 74 MMHG | SYSTOLIC BLOOD PRESSURE: 152 MMHG | HEART RATE: 74 BPM | HEIGHT: 68 IN | WEIGHT: 174 LBS

## 2021-11-23 DIAGNOSIS — D50.9 IRON DEFICIENCY ANEMIA, UNSPECIFIED: ICD-10-CM

## 2021-11-23 LAB — FERRITIN SERPL-MCNC: 50 NG/ML

## 2021-11-23 PROCEDURE — 99214 OFFICE O/P EST MOD 30 MIN: CPT

## 2021-11-24 ENCOUNTER — EMERGENCY (EMERGENCY)
Facility: HOSPITAL | Age: 61
LOS: 0 days | Discharge: HOME | End: 2021-11-24
Attending: STUDENT IN AN ORGANIZED HEALTH CARE EDUCATION/TRAINING PROGRAM | Admitting: STUDENT IN AN ORGANIZED HEALTH CARE EDUCATION/TRAINING PROGRAM
Payer: COMMERCIAL

## 2021-11-24 VITALS
SYSTOLIC BLOOD PRESSURE: 190 MMHG | OXYGEN SATURATION: 98 % | WEIGHT: 173.94 LBS | RESPIRATION RATE: 18 BRPM | TEMPERATURE: 98 F | DIASTOLIC BLOOD PRESSURE: 98 MMHG | HEART RATE: 97 BPM

## 2021-11-24 DIAGNOSIS — R51.9 HEADACHE, UNSPECIFIED: ICD-10-CM

## 2021-11-24 DIAGNOSIS — Z88.6 ALLERGY STATUS TO ANALGESIC AGENT: ICD-10-CM

## 2021-11-24 DIAGNOSIS — Z87.39 PERSONAL HISTORY OF OTHER DISEASES OF THE MUSCULOSKELETAL SYSTEM AND CONNECTIVE TISSUE: ICD-10-CM

## 2021-11-24 DIAGNOSIS — Z87.19 PERSONAL HISTORY OF OTHER DISEASES OF THE DIGESTIVE SYSTEM: ICD-10-CM

## 2021-11-24 LAB
CRP SERPL-MCNC: 9 MG/L
ERYTHROCYTE [SEDIMENTATION RATE] IN BLOOD BY WESTERGREN METHOD: 24 MM/HR
RHEUMATOID FACT SER QL: <10 IU/ML

## 2021-11-24 PROCEDURE — 99284 EMERGENCY DEPT VISIT MOD MDM: CPT

## 2021-11-24 PROCEDURE — 70450 CT HEAD/BRAIN W/O DYE: CPT | Mod: 26,MA

## 2021-11-24 RX ORDER — ACETAMINOPHEN 500 MG
975 TABLET ORAL ONCE
Refills: 0 | Status: COMPLETED | OUTPATIENT
Start: 2021-11-24 | End: 2021-11-24

## 2021-11-24 RX ADMIN — Medication 975 MILLIGRAM(S): at 12:37

## 2021-11-24 NOTE — CONSULT LETTER
[Dear  ___] : Dear  [unfilled], [Consult Letter:] : I had the pleasure of evaluating your patient, [unfilled]. [Please see my note below.] : Please see my note below. [Consult Closing:] : Thank you very much for allowing me to participate in the care of this patient.  If you have any questions, please do not hesitate to contact me. [Sincerely,] : Sincerely, [DrSekou  ___] : Dr. BECERRA [FreeTextEntry2] : Cornelius Gonzales MD\par [facility] Services\par [phone] PHONE\par  [FreeTextEntry3] : Edsi Mckeon DO\par Attending Physician,\par Hematology/ Medical Oncology\par [phone] office\par \par

## 2021-11-24 NOTE — ED PROVIDER NOTE - NS ED ROS FT
Constitutional: See HPI.  Eyes: No visual changes, eye pain or discharge. No Photophobia  ENMT: No hearing changes, pain, discharge or infections. No neck pain or stiffness. No limited ROM  Cardiac: No SOB or edema. No chest pain with exertion.  MS: No myalgia, muscle weakness, joint pain or back pain.  Neuro: + headache, no sensory deficit or weakness. No LOC.  Skin: No skin rash.  Except as documented in the HPI, all other systems are negative.

## 2021-11-24 NOTE — ASSESSMENT
[FreeTextEntry1] : #KAT due to crohn's disease; asymptomatic; hg >11g; ferritin decreased; . \par - Advised not to take iron pills at this time. \par - S/p venofer 200mg x5 infusions (completed 11.23.18 + August 2019) with improvement of hg to >12g and normalization of ferritin and iron saturation\par - ****saw gi to schedule surveillance colonoscopy in light of ulcerative colitis; last colonoscopy 2021; recommend surveillance every 3 years - discuss w/ GI pleae\par - NEW HEADACHE over a week; next f/u w/ PMD in one week; consdier MR brain w/wo IVC if no change\par - if iron levels drop again, will plan to replete with IV venofer\par \par RTC in 3 months with CBC, BMP, LFTs, CEA, iron studies, ferritin 2 days prior\par \par ADDENDUM;as a w/u of new onset unilateral headache,  ESR came high; suspect temporal arteritis; called pt and told him to go to ER ASAP

## 2021-11-24 NOTE — ED PROVIDER NOTE - CARE PROVIDER_API CALL
Kenn Flower)  EEGEpilepsy; Neurology  [address] [place] [address], [address]  [place] [address]  Phone: [phone]  Fax: [phone]  Follow Up Time: 1-3 Days

## 2021-11-24 NOTE — ED PROVIDER NOTE - CLINICAL SUMMARY MEDICAL DECISION MAKING FREE TEXT BOX
61M with pmh ankylosing spondylitis, Crohn's disease, Hx KAT followed by Dr. Mckeon, who was sent to the ER for eval for possible temporal arteritis. No temporal artery ttp, jaw claudication, visual disturbance. Labs and imaging reviewed. HCT wnl, neurology resident evaluated the patient  however patient unable to stay for longer. Pt ambulatory with steady gait. Discussed with neurology doubt temporal arteritis, and patient asking to be discharged. I have fully discussed the medical management and delivery of care with the patient. I have discussed any available labs, imaging and treatment options with the patient. All Questions answered at the bedside and printed copies of all results provided and recommended to review with PCP. Patient confirms understanding and has been given detailed return precautions. Patient instructed to return to the ED should symptoms persist or worsen. Patient has demonstrated capacity and has verbalized understanding. Patient is well appearing upon discharge, ambulatory with a steady gait.

## 2021-11-24 NOTE — CONSULT NOTE ADULT - SUBJECTIVE AND OBJECTIVE BOX
NEUROLOGY CONSULT    HPI:     MEDICATIONS  Home Medications:    MEDICATIONS  (STANDING):  acetaminophen     Tablet .. 975 milliGRAM(s) Oral once    MEDICATIONS  (PRN):      FAMILY HISTORY:    SOCIAL HISTORY: negative for tobacco, alcohol, or ilicit drug use.    Allergies    ASA-Free ([place])    Intolerances        PHYSICAL EXAMINATION:  GEN: NAD, pleasant, cooperative  CVS: RRR, no carotid bruit  CHEST: No signs of  distress, on room air  ABD: Soft, NTTP  NEURO:     MENTAL STATUS: AAOx3    LANG/SPEECH: Fluent, intact naming, repetition & comprehension    CRANIAL NERVES:    II: Pupils equal and reactive, no RAPD, normal visual field and fundus    III, IV, VI: EOM intact, no gaze preference or deviation    V: normal    VII: no facial asymmetry    VIII: normal hearing to speech    MOTOR: 5/5 in both upper and lower extremities    REFLEXES: 2/4 throughout, bilateral flexor planters    SENSORY: Normal to touch, temperature & pin prick in all extremiteis    COORD: Normal finger to nose and heel to shin, no tremor, no dysmetria  LABS:          Hemoglobin A1C:   Vitamin B12     CAPILLARY BLOOD GLUCOSE                  Microbiology:      RADIOLOGY, EKG AND ADDITIONAL TESTS: Reviewed.             NEUROLOGY CONSULT    Saw and examined patient. Report L unilateral headache, 5 /10, radiating to occiput. No exacerbating factors but relieved by Tylenol   Denies visual disturbances, fevers or any other constitutional symptoms.       HPI:  Per ED  · HPI Objective Statement: 61M with pmh ankylosing spondylitis, Crohn's disease, Hx KAT followed by Dr. Mckeon, previously on venofer 200mg x5 infusions (completed 2018, 2019) with improvement to Hb >12 who was sent to the ER for eval for possible temporal arteritis. Labs drawn with heme/onc 11/23 showed ESR 24, CRP 9 and patient was reporting L-sided headache x1 week; He was called today and told to come to the ED. Patient reports pain originated along his L paracervical musculature, gradually radiating to his occiput, and now extends to the L temporal region. Pain mild; denies vision changes, aphasia, ataxia, tinnitis, decreased hearing, n/v/f/c, focal weakness or numbness, jaw claudication.     NEW HEADACHE over a week; next f/u w/ PMD in one week; consdier MR brain w/wo IVC if no change  ADDENDUM;as a w/u of new onset unilateral headache, ESR came high; suspect temporal arteritis; called pt and told him to go to ER ASAP           MEDICATIONS  Home Medications:    MEDICATIONS  (STANDING):  acetaminophen     Tablet .. 975 milliGRAM(s) Oral once    MEDICATIONS  (PRN):      FAMILY HISTORY:    SOCIAL HISTORY: negative for tobacco, alcohol, or ilicit drug use.    Allergies    ASA-Free ([place])    Intolerances        PHYSICAL EXAMINATION:  GEN: NAD, pleasant, cooperative  CVS: RRR, no carotid bruit  CHEST: No signs of  distress, on room air  ABD: Soft, NTTP  NEURO:     MENTAL STATUS: AAOx3    LANG/SPEECH: Fluent, intact naming, repetition & comprehension    CRANIAL NERVES:    II: Pupils equal and reactive, no RAPD, normal visual field and fundus    III, IV, VI: EOM intact, no gaze preference or deviation    V: normal    VII: no facial asymmetry    VIII: normal hearing to speech    MOTOR: 5/5 in both upper and lower extremities    REFLEXES: 2/4 throughout, bilateral flexor planters    SENSORY: Normal to touch, temperature & pin prick in all extremiteis    COORD: Normal finger to nose and heel to shin, no tremor, no dysmetria  LABS:          Hemoglobin A1C:   Vitamin B12     CAPILLARY BLOOD GLUCOSE                  Microbiology:      RADIOLOGY, EKG AND ADDITIONAL TESTS: Reviewed.        EXAM:  CT BRAIN            PROCEDURE DATE:  11/24/2021            INTERPRETATION:  CLINICAL INDICATION: Left-sided headache for one week.    Technique: CT of the head was performed without contrast.    Multiple contiguous axial images were acquiredfrom the skullbase to the vertex without the administration of intravenous contrast.  Coronal and sagittal reformations were made.    COMPARISON: None    FINDINGS:    The ventricles and sulci are unremarkable in appearance.     There is no intraparenchymal hematoma, mass effect or midline shift. No abnormal extra-axial fluid collections are present.    The calvarium is intact. The visualized intraorbital compartments, paranasal sinuses and mastoid complexes appear free of acute disease.    IMPRESSION:  No acute intracranial pathology. No evidence of midline shift, mass effect or intracranial hemorrhage.    --- End of Report ---           NEUROLOGY CONSULT    Saw and examined patient. Report L unilateral headache, 5 /10, radiating to occiput. No exacerbating factors but relieved by Tylenol   Denies visual disturbances, jaw claudication, fevers or any other constitutional symptoms.       HPI:  Per ED  · HPI Objective Statement: 61M with pmh ankylosing spondylitis, Crohn's disease, Hx KAT followed by Dr. Mckeon, previously on venofer 200mg x5 infusions (completed 2018, 2019) with improvement to Hb >12 who was sent to the ER for eval for possible temporal arteritis. Labs drawn with heme/onc 11/23 showed ESR 24, CRP 9 and patient was reporting L-sided headache x1 week; He was called today and told to come to the ED. Patient reports pain originated along his L paracervical musculature, gradually radiating to his occiput, and now extends to the L temporal region. Pain mild; denies vision changes, aphasia, ataxia, tinnitis, decreased hearing, n/v/f/c, focal weakness or numbness, jaw claudication.     NEW HEADACHE over a week; next f/u w/ PMD in one week; consdier MR brain w/wo IVC if no change  ADDENDUM;as a w/u of new onset unilateral headache, ESR came high; suspect temporal arteritis; called pt and told him to go to ER ASAP           MEDICATIONS  Home Medications:    MEDICATIONS  (STANDING):  acetaminophen     Tablet .. 975 milliGRAM(s) Oral once    MEDICATIONS  (PRN):      FAMILY HISTORY:    SOCIAL HISTORY: negative for tobacco, alcohol, or ilicit drug use.    Allergies    ASA-Free ([place])    Intolerances        PHYSICAL EXAMINATION:  GEN: NAD, pleasant, cooperative  CVS: RRR, no carotid bruit  CHEST: No signs of  distress, on room air  ABD: Soft, NTTP  NEURO:     MENTAL STATUS: AAOx3    LANG/SPEECH: Fluent, intact naming, repetition & comprehension    CRANIAL NERVES:    II: Pupils equal and reactive, no RAPD, normal visual field and fundus    III, IV, VI: EOM intact, no gaze preference or deviation    V: normal    VII: no facial asymmetry    VIII: normal hearing to speech    MOTOR: 5/5 in both upper and lower extremities    REFLEXES: 2/4 throughout, bilateral flexor planters    SENSORY: Normal to touch, temperature & pin prick in all extremiteis    COORD: Normal finger to nose and heel to shin, no tremor, no dysmetria  LABS:          Hemoglobin A1C:   Vitamin B12     CAPILLARY BLOOD GLUCOSE                  Microbiology:      RADIOLOGY, EKG AND ADDITIONAL TESTS: Reviewed.        EXAM:  CT BRAIN            PROCEDURE DATE:  11/24/2021            INTERPRETATION:  CLINICAL INDICATION: Left-sided headache for one week.    Technique: CT of the head was performed without contrast.    Multiple contiguous axial images were acquiredfrom the skullbase to the vertex without the administration of intravenous contrast.  Coronal and sagittal reformations were made.    COMPARISON: None    FINDINGS:    The ventricles and sulci are unremarkable in appearance.     There is no intraparenchymal hematoma, mass effect or midline shift. No abnormal extra-axial fluid collections are present.    The calvarium is intact. The visualized intraorbital compartments, paranasal sinuses and mastoid complexes appear free of acute disease.    IMPRESSION:  No acute intracranial pathology. No evidence of midline shift, mass effect or intracranial hemorrhage.    --- End of Report ---

## 2021-11-24 NOTE — ED PROVIDER NOTE - PATIENT PORTAL LINK FT
You can access the FollowMyHealth Patient Portal offered by [facility] by registering at the following website: [email or web address]. By joining [facility]’s FollowMyHealth portal, you will also be able to view your health information using other applications (apps) compatible with our system.

## 2021-11-24 NOTE — ED PROVIDER NOTE - OBJECTIVE STATEMENT
#KAT due to crohn's disease; asymptomatic; hg >11g; ferritin decreased;.   - Advised not to take iron pills at this time.   - S/p venofer 200mg x5 infusions (completed 11.23.18 + August 2019) with improvement of hg to >12g and normalization of ferritin and iron saturation  - ****saw gi to schedule surveillance colonoscopy in light of ulcerative colitis; last colonoscopy 2021; recommend surveillance every 3 years - discuss w/ GI pleae  - NEW HEADACHE over a week; next f/u w/ PMD in one week; consdier MR brain w/wo IVC if no change  - if iron levels drop again, will plan to replete with IV venofer    RTC in 3 months with CBC, BMP, LFTs, CEA, iron studies, ferritin 2 days prior    ADDENDUM;as a w/u of new onset unilateral headache, ESR came high; suspect temporal arteritis; called pt and told him to go to ER ASAP 61M with pmh ankylosing spondylitis, Crohn's disease, Hx KAT followed by Dr. Mckeon, previously on venofer 200mg x5 infusions (completed 2018, 2019) with improvement to Hb >12 who was sent to the ER for eval for possible temporal arteritis. Labs drawn with heme/onc 11/23 showed ESR 24, CRP 9 and patient was reporting L-sided headache x1 week; He was called today and told to come to the ED. Patient reports pain originated along his L paracervical musculature, gradually radiating to his occiput, and now extends to the L temporal region. Pain mild; denies vision changes, aphasia, ataxia, tinnitis, decreased hearing, n/v/f/c, focal weakness or numbness, jaw claudication.     NEW HEADACHE over a week; next f/u w/ PMD in one week; consdier MR brain w/wo IVC if no change  ADDENDUM;as a w/u of new onset unilateral headache, ESR came high; suspect temporal arteritis; called pt and told him to go to ER ASAP 61M with pmh ankylosing spondylitis, Crohn's disease, Hx KAT followed by Dr. Mckeon, previously on venofer 200mg x5 infusions (completed 2018, 2019) with improvement to Hb >12 who was sent to the ER for eval for possible temporal arteritis. Labs drawn with heme/onc 11/23 showed ESR 24, CRP 9 and patient was reporting L-sided headache x1 week; He was called today and told to come to the ED. Patient reports pain originated along his L paracervical musculature, gradually radiating to his occiput, and now extends to the L temporal region. Pain mild; denies vision changes, aphasia, ataxia, tinnitis, decreased hearing, n/v/f/c, focal weakness or numbness, jaw claudication.

## 2021-11-24 NOTE — CONSULT NOTE ADULT - ASSESSMENT
61M with pmh ankylosing spondylitis, Crohn's disease, Hx KAT sent to ED for elevated ESR and CRP in the setting of L sided unilateral headache. Patient denies fevers, body aches, weakness or visual disturbances  Physical exam is unremarkable. Vitals noted to be stable besides elevated bp of 190/98.   CT head unremarkable.           Recs:    61M with pmh ankylosing spondylitis, Crohn's disease, Hx KAT sent to ED for elevated ESR and CRP in the setting of L sided unilateral headache. Patient denies fevers, body aches, weakness or visual disturbances  Physical exam is unremarkable. Vitals noted to be stable besides elevated bp of 190/98.   CT head unremarkable.   Patient left before our final evaluation and rounds.

## 2021-11-24 NOTE — ED ADULT TRIAGE NOTE - CHIEF COMPLAINT QUOTE
" I saw my doctor yesterday, he took blood work and saw results today told me to come to er" Left side head pain. denies dizziness, denies blurr vision

## 2021-11-24 NOTE — ED PROVIDER NOTE - PHYSICAL EXAMINATION
Gen - NAD, Head - NCAT, No tenderness to palpation b/l temporal region, Eyes: PERRL, 3 mm bilateral, no nystagmus, EOM intact; conjunctiva and sclera clear. NECK: Supple; non tender. + full passive ROM in all directions. No JVD, Pharynx - clear, MMM, Ears- clear TM b/l, Heart - RRR, no m/g/r, Lungs - CTAB, no w/c/r, Abdomen - soft, NT, ND, Skin - No rash, Extremities - FROM, no edema, erythema, ecchymosis, brisk cap refill, Neuro - Neuro: CN 2-12 intact, normal finger to nose, normal romberg, stable gait, no sensory or motor deficits, Alert, oriented, grossly unremarkable. No Focal deficits. GCS 15. NIH 0

## 2021-11-24 NOTE — ED ADULT NURSE NOTE - OBJECTIVE STATEMENT
Sent in by PMD s/p getting blood work done. As per pt., "my doctor vonnie blood yesterday and told me to come to ER today." C/o left side head pain.

## 2021-11-24 NOTE — ED ADULT NURSE NOTE - NSIMPLEMENTINTERV_GEN_ALL_ED
Implemented All Universal Safety Interventions:  [place] to call system. Call bell, personal items and telephone within reach. Instruct patient to call for assistance. Room bathroom lighting operational. Non-slip footwear when patient is off stretcher. Physically safe environment: no spills, clutter or unnecessary equipment. Stretcher in lowest position, wheels locked, appropriate side rails in place.

## 2021-11-24 NOTE — HISTORY OF PRESENT ILLNESS
[de-identified] : 6/12/19\par He is here for follow-up of KAT.  He completed venofer x 5 in 11/2018.  Most recent CBC is stable with Hgb 12.0g/dL, ferritin 34 and ironsat 17%.  He denies any CP, SOB, fatigue or bleeding.  \par \par 7/24/19\par Patient is here for a follow-up visit of KAT.  \par \par 4/21/21\par Patient is here for a follow-up visit for KAT.  He is feeling well with no new complaints.  Reviewed most recent CBC which shows stable anemia at 13.3g/dL with normal iron stores.  He does not take oral iron.  Patient denies fever, chills or bleeding.  He states he follows with GI for management Crohns disease, but it is stable so he has not seen them in a while.   [de-identified] : \par

## 2021-12-01 LAB — ANA SER IF-ACNC: NEGATIVE

## 2021-12-07 ENCOUNTER — APPOINTMENT (OUTPATIENT)
Dept: RHEUMATOLOGY | Facility: CLINIC | Age: 61
End: 2021-12-07
Payer: COMMERCIAL

## 2021-12-07 VITALS
DIASTOLIC BLOOD PRESSURE: 90 MMHG | SYSTOLIC BLOOD PRESSURE: 145 MMHG | WEIGHT: 170 LBS | OXYGEN SATURATION: 98 % | TEMPERATURE: 97.9 F | HEART RATE: 81 BPM | BODY MASS INDEX: 25.76 KG/M2 | HEIGHT: 68 IN

## 2021-12-07 DIAGNOSIS — M24.60 ANKYLOSIS, UNSPECIFIED JOINT: ICD-10-CM

## 2021-12-07 PROCEDURE — 99205 OFFICE O/P NEW HI 60 MIN: CPT

## 2021-12-07 RX ORDER — DICYCLOMINE HYDROCHLORIDE 10 MG/1
10 CAPSULE ORAL
Qty: 120 | Refills: 0 | Status: DISCONTINUED | COMMUNITY
Start: 2019-02-06 | End: 2021-12-07

## 2021-12-07 RX ORDER — BUDESONIDE 3 MG/1
3 CAPSULE, COATED PELLETS ORAL
Refills: 0 | Status: DISCONTINUED | COMMUNITY
End: 2021-12-07

## 2021-12-07 RX ORDER — ADALIMUMAB 40MG/0.4ML
40 KIT SUBCUTANEOUS
Qty: 4 | Refills: 0 | Status: DISCONTINUED | COMMUNITY
Start: 2019-05-09 | End: 2021-12-07

## 2021-12-07 RX ORDER — PANTOPRAZOLE 20 MG/1
20 TABLET, DELAYED RELEASE ORAL
Qty: 90 | Refills: 0 | Status: DISCONTINUED | COMMUNITY
Start: 2019-08-12 | End: 2021-12-07

## 2021-12-07 RX ORDER — RANITIDINE 300 MG/1
300 TABLET ORAL
Qty: 30 | Refills: 0 | Status: DISCONTINUED | COMMUNITY
Start: 2019-02-04 | End: 2021-12-07

## 2021-12-07 RX ORDER — ADALIMUMAB 40MG/0.8ML
40 KIT SUBCUTANEOUS
Qty: 2 | Refills: 0 | Status: DISCONTINUED | COMMUNITY
Start: 2018-06-28 | End: 2021-12-07

## 2021-12-07 NOTE — HISTORY OF PRESENT ILLNESS
[FreeTextEntry1] : 61-year-old male with a past medical history of ankylosing spondylitis on Humira, and deficiency anemia, Crohn's disease who presents for evaluation of headache and concern for giant cell arteritis.\par \par Patient reports developing a headache that started the week before Thanksgiving.  Symptoms started from the neck and traveled up through the occiput along the left temporal area and caused eye pain as well.  Headache symptoms were intermittent coming and going without clear exacerbating provoking or relieving factors.  States it felt like a migraine.  Tylenol helped partially alleviate his symptoms.  He denied any visual disturbances, fevers, jaw claudication, tongue pain or oral pain, myalgias, arthralgias, weight loss, sweats, skin rash, sinus problems, shoulder pains, hip pains, respiratory issues.  He had an elevated sedimentation rate 24 and c-reactive protein 9. He went to his PCP Monday ESR was normal (12). He reports his headache has resolved and he is feeling better. \par \par

## 2021-12-07 NOTE — PHYSICAL EXAM
[General Appearance - Alert] : alert [General Appearance - In No Acute Distress] : in no acute distress [Sclera] : the sclera and conjunctiva were normal [PERRL With Normal Accommodation] : pupils were equal in size, round, and reactive to light [Extraocular Movements] : extraocular movements were intact [Outer Ear] : the ears and nose were normal in appearance [Oropharynx] : the oropharynx was normal [FreeTextEntry1] : No temporal artery tenderness bilaterally [Neck Appearance] : the appearance of the neck was normal [Neck Cervical Mass (___cm)] : no neck mass was observed [Jugular Venous Distention Increased] : there was no jugular-venous distention [Thyroid Diffuse Enlargement] : the thyroid was not enlarged [Thyroid Nodule] : there were no palpable thyroid nodules [Auscultation Breath Sounds / Voice Sounds] : lungs were clear to auscultation bilaterally [Heart Rate And Rhythm] : heart rate was normal and rhythm regular [Heart Sounds] : normal S1 and S2 [Heart Sounds Gallop] : no gallops [Murmurs] : no murmurs [Heart Sounds Pericardial Friction Rub] : no pericardial rub [Bowel Sounds] : normal bowel sounds [Abdomen Soft] : soft [Abdomen Tenderness] : non-tender [Abdomen Mass (___ Cm)] : no abdominal mass palpated [Abnormal Walk] : normal gait [Nail Clubbing] : no clubbing  or cyanosis of the fingernails [Involuntary Movements] : no involuntary movements were seen [Musculoskeletal - Swelling] : no joint swelling seen [Motor Tone] : muscle strength and tone were normal [Skin Color & Pigmentation] : normal skin color and pigmentation [Skin Turgor] : normal skin turgor [] : no rash [Affect] : the affect was normal [Mood] : the mood was normal

## 2021-12-07 NOTE — ASSESSMENT
[FreeTextEntry1] : 61-year-old male with a past medical history of ankylosing spondylitis on Humira, and deficiency anemia, Crohn's disease who presents for evaluation of headache and concern for giant cell arteritis.\par \par 1. Headache\par -Concern initially for GCA given symptomatology in setting of mild elevated ESR. Both his symptoms and ESR have resolved. He has no systemic or extra cranial symptoms/manifestations of GCA. Educated on signs and symptoms of GCA should his symptoms return advise to follow up with his primary rheum (Dr. Zavala) or myself (if unable to schedule urgent visit)\par -Follow up with his primary rheumatologist for management of ankylosing spondylitis\par

## 2022-01-11 ENCOUNTER — APPOINTMENT (OUTPATIENT)
Dept: HEMATOLOGY ONCOLOGY | Facility: CLINIC | Age: 62
End: 2022-01-11

## 2022-01-24 ENCOUNTER — LABORATORY RESULT (OUTPATIENT)
Age: 62
End: 2022-01-24

## 2022-01-24 ENCOUNTER — APPOINTMENT (OUTPATIENT)
Dept: HEMATOLOGY ONCOLOGY | Facility: CLINIC | Age: 62
End: 2022-01-24

## 2022-01-26 LAB
FERRITIN SERPL-MCNC: 163 NG/ML
HCT VFR BLD CALC: 40.7 %
HGB BLD-MCNC: 12.7 G/DL
IRON SATN MFR SERPL: 23 %
IRON SERPL-MCNC: 67 UG/DL
MCHC RBC-ENTMCNC: 27.3 PG
MCHC RBC-ENTMCNC: 31.2 G/DL
MCV RBC AUTO: 87.3 FL
PLATELET # BLD AUTO: 300 K/UL
PMV BLD: 8.6 FL
RBC # BLD: 4.66 M/UL
RBC # FLD: 13.4 %
TIBC SERPL-MCNC: 287 UG/DL
UIBC SERPL-MCNC: 220 UG/DL
WBC # FLD AUTO: 5.75 K/UL

## 2022-01-27 ENCOUNTER — APPOINTMENT (OUTPATIENT)
Dept: HEMATOLOGY ONCOLOGY | Facility: CLINIC | Age: 62
End: 2022-01-27
Payer: COMMERCIAL

## 2022-01-27 ENCOUNTER — OUTPATIENT (OUTPATIENT)
Dept: OUTPATIENT SERVICES | Facility: HOSPITAL | Age: 62
LOS: 1 days | Discharge: HOME | End: 2022-01-27

## 2022-01-27 PROCEDURE — 99214 OFFICE O/P EST MOD 30 MIN: CPT | Mod: 95

## 2022-01-27 NOTE — CONSULT LETTER
[Dear  ___] : Dear  [unfilled], [Consult Letter:] : I had the pleasure of evaluating your patient, [unfilled]. [Please see my note below.] : Please see my note below. [Consult Closing:] : Thank you very much for allowing me to participate in the care of this patient.  If you have any questions, please do not hesitate to contact me. [Sincerely,] : Sincerely, [DrSekou  ___] : Dr. BECERRA [FreeTextEntry2] : Cornelius Gonzales MD\par [facility]\par [phone] PHONE\par  [FreeTextEntry3] : Edis Mckeon DO\par Attending Physician,\par Hematology/ Medical Oncology\par [phone] office\par \par

## 2022-01-27 NOTE — REASON FOR VISIT
[Follow-Up Visit] : a follow-up [Home] : at home, [unfilled] , at the time of the visit. [Medical Office: ([facility])___] : at the medical office located in  [Verbal consent obtained from patient] : the patient, [unfilled] [FreeTextEntry2] : KAT

## 2022-01-27 NOTE — HISTORY OF PRESENT ILLNESS
[de-identified] : 6/12/19\par He is here for follow-up of KAT.  He completed venofer x 5 in 11/2018.  Most recent CBC is stable with Hgb 12.0g/dL, ferritin 34 and ironsat 17%.  He denies any CP, SOB, fatigue or bleeding.  \par \par 7/24/19\par Patient is here for a follow-up visit of KAT.  \par \par 4/21/21\par Patient is here for a follow-up visit for KAT.  He is feeling well with no new complaints.  Reviewed most recent CBC which shows stable anemia at 13.3g/dL with normal iron stores.  He does not take oral iron.  Patient denies fever, chills or bleeding.  He states he follows with GI for management Crohns disease, but it is stable so he has not seen them in a while.   [de-identified] : 57 yo man with Crohn's disease and ankylosing spondylitis has chronic iron deficiency anemia. His hg in Oct'2017 was 12g and in June 2018 11g; S/p 5 doses of venofer completed on 11.23.18.

## 2022-01-27 NOTE — ASSESSMENT
[FreeTextEntry1] : #KAT due to crohn's disease; asymptomatic; hg >11g; ferritin decreased; . \par - Advised not to take iron pills at this time. \par - S/p venofer 200mg x5 infusions (completed 11.23.18 + August 2019) with improvement of hg to >12g and normalization of ferritin and iron saturation\par - ****saw gi to schedule surveillance colonoscopy in light of ulcerative colitis; last colonoscopy 2021; recommend surveillance every 3 years - discuss w/ GI pleae\par - f/u rheum re; pmh of headache and high esr; spoke to dr Wiggins\par - if iron levels drop again, will plan to replete with IV venofer\par \par RTC in 3 months with CBC, BMP, LFTs, CEA, iron studies, ferritin 2 days prior\par  Island Pedicle Flap Text: The defect edges were debeveled with a #15 scalpel blade.  Given the location of the defect, shape of the defect and the proximity to free margins an island pedicle advancement flap was deemed most appropriate.  Using a sterile surgical marker, an appropriate advancement flap was drawn incorporating the defect, outlining the appropriate donor tissue and placing the expected incisions within the relaxed skin tension lines where possible.    The area thus outlined was incised deep to adipose tissue with a #15 scalpel blade.  The skin margins were undermined to an appropriate distance in all directions around the primary defect and laterally outward around the island pedicle utilizing iris scissors.  There was minimal undermining beneath the pedicle flap.

## 2022-03-03 DIAGNOSIS — D50.9 IRON DEFICIENCY ANEMIA, UNSPECIFIED: ICD-10-CM

## 2022-04-25 ENCOUNTER — LABORATORY RESULT (OUTPATIENT)
Age: 62
End: 2022-04-25

## 2022-04-25 ENCOUNTER — APPOINTMENT (OUTPATIENT)
Dept: HEMATOLOGY ONCOLOGY | Facility: CLINIC | Age: 62
End: 2022-04-25

## 2022-04-25 LAB
ALBUMIN SERPL ELPH-MCNC: 3.8 G/DL
ALP BLD-CCNC: 69 U/L
ALT SERPL-CCNC: 12 U/L
ANION GAP SERPL CALC-SCNC: 9 MMOL/L
AST SERPL-CCNC: 17 U/L
BILIRUB DIRECT SERPL-MCNC: <0.2 MG/DL
BILIRUB INDIRECT SERPL-MCNC: >0.3 MG/DL
BILIRUB SERPL-MCNC: 0.5 MG/DL
BUN SERPL-MCNC: 11 MG/DL
CALCIUM SERPL-MCNC: 8.7 MG/DL
CHLORIDE SERPL-SCNC: 102 MMOL/L
CO2 SERPL-SCNC: 27 MMOL/L
CREAT SERPL-MCNC: 0.9 MG/DL
EGFR: 97 ML/MIN/1.73M2
GLUCOSE SERPL-MCNC: 78 MG/DL
HCT VFR BLD CALC: 39.3 %
HGB BLD-MCNC: 12.8 G/DL
IRON SATN MFR SERPL: 12 %
IRON SERPL-MCNC: 42 UG/DL
MCHC RBC-ENTMCNC: 27.9 PG
MCHC RBC-ENTMCNC: 32.6 G/DL
MCV RBC AUTO: 85.8 FL
PLATELET # BLD AUTO: 260 K/UL
PMV BLD: 8.8 FL
POTASSIUM SERPL-SCNC: 4.5 MMOL/L
PROT SERPL-MCNC: 6.7 G/DL
RBC # BLD: 4.58 M/UL
RBC # FLD: 13.2 %
SODIUM SERPL-SCNC: 138 MMOL/L
TIBC SERPL-MCNC: 355 UG/DL
UIBC SERPL-MCNC: 313 UG/DL
WBC # FLD AUTO: 7.11 K/UL

## 2022-04-26 LAB — FERRITIN SERPL-MCNC: 39 NG/ML

## 2022-04-28 ENCOUNTER — APPOINTMENT (OUTPATIENT)
Dept: HEMATOLOGY ONCOLOGY | Facility: CLINIC | Age: 62
End: 2022-04-28
Payer: COMMERCIAL

## 2022-04-28 ENCOUNTER — OUTPATIENT (OUTPATIENT)
Dept: OUTPATIENT SERVICES | Facility: HOSPITAL | Age: 62
LOS: 1 days | Discharge: HOME | End: 2022-04-28

## 2022-04-28 DIAGNOSIS — D50.9 IRON DEFICIENCY ANEMIA, UNSPECIFIED: ICD-10-CM

## 2022-04-28 PROCEDURE — 99213 OFFICE O/P EST LOW 20 MIN: CPT | Mod: 95

## 2022-04-28 NOTE — HISTORY OF PRESENT ILLNESS
[de-identified] : 6/12/19\par He is here for follow-up of KAT.  He completed venofer x 5 in 11/2018.  Most recent CBC is stable with Hgb 12.0g/dL, ferritin 34 and ironsat 17%.  He denies any CP, SOB, fatigue or bleeding.  \par \par 7/24/19\par Patient is here for a follow-up visit of KAT.  \par \par 4/21/21\par Patient is here for a follow-up visit for KAT.  He is feeling well with no new complaints.  Reviewed most recent CBC which shows stable anemia at 13.3g/dL with normal iron stores.  He does not take oral iron.  Patient denies fever, chills or bleeding.  He states he follows with GI for management Crohns disease, but it is stable so he has not seen them in a while.   [de-identified] : \par

## 2022-04-28 NOTE — REASON FOR VISIT
[Follow-Up Visit] : a follow-up visit for [FreeTextEntry2] : KAT [Home] : at home, [unfilled] , at the time of the visit. [Medical Office: ([facility])___] : at the medical office located in  [Verbal consent obtained from patient] : the patient, [unfilled]

## 2022-04-28 NOTE — REASON FOR VISIT
[Follow-Up Visit] : a follow-up visit for [FreeTextEntry2] : KAT [Home] : at home, [unfilled] , at the time of the visit. [Medical Office: ([facility])___] : at the medical office located in  [Verbal consent obtained from patient] : the patient, [unfilled]

## 2022-04-28 NOTE — ASSESSMENT
[FreeTextEntry1] : #KAT due to crohn's disease; asymptomatic; hg >11g; ferritin decreased; . \par - Advised not to take iron pills at this time. \par - S/p venofer 200mg x5 infusions (completed 11.23.18 + August 2019) with improvement of hg to >12g and normalization of ferritin and iron saturation\par - ****saw gi to schedule surveillance colonoscopy in light of ulcerative colitis; last colonoscopy 2021; recommend surveillance every 3 years - discuss w/ GI pleae\par - f/u rheum re; pmh of headache and high esr; spoke to dr Wiggins\par - if iron levels drop again, will plan to replete with IV venofer\par \par RTC in 2 months with CBC, BMP, LFTs, CEA, iron studies, ferritin 2 days prior\par

## 2022-04-28 NOTE — CONSULT LETTER
[Dear  ___] : Dear  [unfilled], [Consult Letter:] : I had the pleasure of evaluating your patient, [unfilled]. [Please see my note below.] : Please see my note below. [Consult Closing:] : Thank you very much for allowing me to participate in the care of this patient.  If you have any questions, please do not hesitate to contact me. [Sincerely,] : Sincerely, [FreeTextEntry2] : Cornelius Gonzales MD\par [facility]\par [phone] PHONE\par  [FreeTextEntry3] : Edis Mckeon DO\par Attending Physician,\par Hematology/ Medical Oncology\par [phone] office\par \par  [DrSekou  ___] : Dr. BECERRA

## 2022-04-28 NOTE — HISTORY OF PRESENT ILLNESS
[de-identified] : 6/12/19\par He is here for follow-up of KAT.  He completed venofer x 5 in 11/2018.  Most recent CBC is stable with Hgb 12.0g/dL, ferritin 34 and ironsat 17%.  He denies any CP, SOB, fatigue or bleeding.  \par \par 7/24/19\par Patient is here for a follow-up visit of KAT.  \par \par 4/21/21\par Patient is here for a follow-up visit for KAT.  He is feeling well with no new complaints.  Reviewed most recent CBC which shows stable anemia at 13.3g/dL with normal iron stores.  He does not take oral iron.  Patient denies fever, chills or bleeding.  He states he follows with GI for management Crohns disease, but it is stable so he has not seen them in a while.   [de-identified] : \par

## 2022-04-28 NOTE — CONSULT LETTER
[Dear  ___] : Dear  [unfilled], [Consult Letter:] : I had the pleasure of evaluating your patient, [unfilled]. [Please see my note below.] : Please see my note below. [Consult Closing:] : Thank you very much for allowing me to participate in the care of this patient.  If you have any questions, please do not hesitate to contact me. [Sincerely,] : Sincerely, [FreeTextEntry2] : Cornelius Gonzales MD\par [facility]\par [phone] PHONE\par  [FreeTextEntry3] : Edis Mckeon DO\par Attending Physician,\par Hematology/ Medical Oncology\par [phone] office\par \par  [DrSekou  ___] : Dr. BECERRA

## 2022-04-28 NOTE — REVIEW OF SYSTEMS
[Fatigue] : no fatigue [Chest Pain] : no chest pain [Shortness Of Breath] : no shortness of breath [Negative] : Allergic/Immunologic [FreeTextEntry7] : no bleeding

## 2022-04-29 DIAGNOSIS — K50.90 CROHN'S DISEASE, UNSPECIFIED, WITHOUT COMPLICATIONS: ICD-10-CM

## 2022-04-29 DIAGNOSIS — M45.9 ANKYLOSING SPONDYLITIS OF UNSPECIFIED SITES IN SPINE: ICD-10-CM

## 2022-06-13 ENCOUNTER — LABORATORY RESULT (OUTPATIENT)
Age: 62
End: 2022-06-13

## 2022-06-13 ENCOUNTER — APPOINTMENT (OUTPATIENT)
Dept: HEMATOLOGY ONCOLOGY | Facility: CLINIC | Age: 62
End: 2022-06-13

## 2022-06-13 LAB
HCT VFR BLD CALC: 38.5 %
HGB BLD-MCNC: 12.4 G/DL
MCHC RBC-ENTMCNC: 27.7 PG
MCHC RBC-ENTMCNC: 32.2 G/DL
MCV RBC AUTO: 85.9 FL
PLATELET # BLD AUTO: 245 K/UL
PMV BLD: 8.7 FL
RBC # BLD: 4.48 M/UL
RBC # FLD: 13.2 %
WBC # FLD AUTO: 7.46 K/UL

## 2022-06-14 LAB
FERRITIN SERPL-MCNC: 52 NG/ML
FOLATE SERPL-MCNC: 16.3 NG/ML
IRON SATN MFR SERPL: 20 %
IRON SERPL-MCNC: 62 UG/DL
TIBC SERPL-MCNC: 308 UG/DL
UIBC SERPL-MCNC: 246 UG/DL
VIT B12 SERPL-MCNC: 390 PG/ML

## 2022-06-16 ENCOUNTER — OUTPATIENT (OUTPATIENT)
Dept: OUTPATIENT SERVICES | Facility: HOSPITAL | Age: 62
LOS: 1 days | Discharge: HOME | End: 2022-06-16

## 2022-06-16 ENCOUNTER — APPOINTMENT (OUTPATIENT)
Dept: HEMATOLOGY ONCOLOGY | Facility: CLINIC | Age: 62
End: 2022-06-16
Payer: COMMERCIAL

## 2022-06-16 PROCEDURE — 99213 OFFICE O/P EST LOW 20 MIN: CPT | Mod: 95

## 2022-06-16 NOTE — CONSULT LETTER
[Dear  ___] : Dear  [unfilled], [Consult Letter:] : I had the pleasure of evaluating your patient, [unfilled]. [Please see my note below.] : Please see my note below. [Consult Closing:] : Thank you very much for allowing me to participate in the care of this patient.  If you have any questions, please do not hesitate to contact me. [Sincerely,] : Sincerely, [DrSekou  ___] : Dr. BECERRA [FreeTextEntry2] : Cornelius Gonzales MD\par [facility]\par [phone] PHONE\par  [FreeTextEntry3] : Edis Mckeon DO\par Attending Physician,\par Hematology/ Medical Oncology\par [phone] office\par \par

## 2022-06-16 NOTE — ASSESSMENT
[FreeTextEntry1] : #KAT due to crohn's disease; asymptomatic; hg >11g; ferritin decreased; . \par - Advised not to take iron pills at this time. \par - S/p venofer 200mg x5 infusions (completed 11.23.18 + August 2019) with improvement of hg to >12g and normalization of ferritin and iron saturation\par - ****saw gi to schedule surveillance colonoscopy in light of ulcerative colitis; last colonoscopy 2021; recommend surveillance every 3 years - discuss w/ GI pleae\par - f/u rheum re; pmh of headache and high esr; spoke to dr Wiggins\par - if iron levels drop again, will plan to replete with IV venofer\par \par RTC in 6 months with CBC, BMP, LFTs, CEA, iron studies, ferritin 2 days prior\par

## 2022-06-16 NOTE — HISTORY OF PRESENT ILLNESS
[de-identified] : 6/12/19\par He is here for follow-up of KAT.  He completed venofer x 5 in 11/2018.  Most recent CBC is stable with Hgb 12.0g/dL, ferritin 34 and ironsat 17%.  He denies any CP, SOB, fatigue or bleeding.  \par \par 7/24/19\par Patient is here for a follow-up visit of KAT.  \par \par 4/21/21\par Patient is here for a follow-up visit for KAT.  He is feeling well with no new complaints.  Reviewed most recent CBC which shows stable anemia at 13.3g/dL with normal iron stores.  He does not take oral iron.  Patient denies fever, chills or bleeding.  He states he follows with GI for management Crohns disease, but it is stable so he has not seen them in a while.   [de-identified] : 59 yo man with Crohn's disease and ankylosing spondylitis has chronic iron deficiency anemia. His hg in Oct'2017 was 12g and in June 2018 11g; S/p 5 doses of venofer completed on 11.23.18.

## 2022-06-16 NOTE — REASON FOR VISIT
[Follow-Up Visit] : a follow-up [Home] : at home, [unfilled] , at the time of the visit. [Medical Office: ([facility])___] : at the medical office located in  [Verbal consent obtained from patient] : the patient, [unfilled] [FreeTextEntry2] : KAT

## 2022-06-17 LAB — METHYLMALONATE SERPL-SCNC: 136 NMOL/L

## 2022-07-14 DIAGNOSIS — D50.9 IRON DEFICIENCY ANEMIA, UNSPECIFIED: ICD-10-CM

## 2022-07-14 DIAGNOSIS — M45.9 ANKYLOSING SPONDYLITIS OF UNSPECIFIED SITES IN SPINE: ICD-10-CM

## 2022-07-14 DIAGNOSIS — K50.90 CROHN'S DISEASE, UNSPECIFIED, WITHOUT COMPLICATIONS: ICD-10-CM

## 2022-10-31 ENCOUNTER — APPOINTMENT (OUTPATIENT)
Dept: HEMATOLOGY ONCOLOGY | Facility: CLINIC | Age: 62
End: 2022-10-31

## 2022-10-31 LAB
BASOPHILS # BLD AUTO: 0.02 K/UL
BASOPHILS NFR BLD AUTO: 0.3 %
EOSINOPHIL # BLD AUTO: 0.13 K/UL
EOSINOPHIL NFR BLD AUTO: 1.9 %
HCT VFR BLD CALC: 37.1 %
HGB BLD-MCNC: 12.1 G/DL
IMM GRANULOCYTES NFR BLD AUTO: 0.1 %
LYMPHOCYTES # BLD AUTO: 2.69 K/UL
LYMPHOCYTES NFR BLD AUTO: 38.4 %
MAN DIFF?: NORMAL
MCHC RBC-ENTMCNC: 28 PG
MCHC RBC-ENTMCNC: 32.6 G/DL
MCV RBC AUTO: 85.9 FL
MONOCYTES # BLD AUTO: 0.55 K/UL
MONOCYTES NFR BLD AUTO: 7.9 %
NEUTROPHILS # BLD AUTO: 3.6 K/UL
NEUTROPHILS NFR BLD AUTO: 51.4 %
PLATELET # BLD AUTO: 273 K/UL
RBC # BLD: 4.32 M/UL
RBC # FLD: 13.2 %
WBC # FLD AUTO: 7 K/UL

## 2022-11-01 LAB
FOLATE SERPL-MCNC: 9.5 NG/ML
IRON SATN MFR SERPL: 16 %
IRON SERPL-MCNC: 52 UG/DL
TIBC SERPL-MCNC: 322 UG/DL
UIBC SERPL-MCNC: 270 UG/DL
VIT B12 SERPL-MCNC: 360 PG/ML

## 2022-11-02 ENCOUNTER — APPOINTMENT (OUTPATIENT)
Dept: HEMATOLOGY ONCOLOGY | Facility: CLINIC | Age: 62
End: 2022-11-02

## 2022-11-02 VITALS
WEIGHT: 170 LBS | BODY MASS INDEX: 25.76 KG/M2 | TEMPERATURE: 98.7 F | HEIGHT: 68 IN | SYSTOLIC BLOOD PRESSURE: 146 MMHG | HEART RATE: 72 BPM | DIASTOLIC BLOOD PRESSURE: 84 MMHG | OXYGEN SATURATION: 98 % | RESPIRATION RATE: 16 BRPM

## 2022-11-02 PROCEDURE — 99214 OFFICE O/P EST MOD 30 MIN: CPT

## 2022-11-02 NOTE — PHYSICAL EXAM
[Restricted in physically strenuous activity but ambulatory and able to carry out work of a light or sedentary nature] : Status 1- Restricted in physically strenuous activity but ambulatory and able to carry out work of a light or sedentary nature, e.g., light house work, office work [Normal] : affect appropriate [de-identified] : wearing glasses

## 2022-11-02 NOTE — ASSESSMENT
[FreeTextEntry1] : # KAT due to crohn's disease; asymptomatic; hg >11g; ferritin decreased.\par \par - Advised not to take iron pills due to underlying Crohn's disease\par - s/p venofer 200mg x5 infusions (completed 11.23.18 + August 2019) with improvement of hg to >12g and normalization of ferritin and iron saturation\par - followup with GI, Dr. Whitehead, as indicated for surveillance colonoscopy in light of ulcerative colitis; last colonoscopy 2021; recommend surveillance every 3 years - discuss w/ GI please\par - followup with [facility], Dr. Mclean, for management of Crohns (on Humira) + ankylosing spondylitis\par - Labwork today: ferritin level \par - if ferritin levels drop again, will plan to replete with IV venofer \par \par RTC in 4 months with Smart NP with CBC, iron studies, ferritin 2 days prior \par seen/examined w PUJA Rosenberg; note reviewed; case discussed\par 61 yo man with Crohn's disease and ankylosisng spondylitis has been treated for iron defiiciency anemia; last venofer treatment was in 2019; will repeat ferritin today ; f/u in 4 months

## 2022-11-02 NOTE — REVIEW OF SYSTEMS
[Negative] : Allergic/Immunologic [Fever] : no fever [Fatigue] : no fatigue [Chest Pain] : no chest pain [Shortness Of Breath] : no shortness of breath [Easy Bleeding] : no tendency for easy bleeding [FreeTextEntry7] : no bleeding

## 2022-11-02 NOTE — CONSULT LETTER
[Dear  ___] : Dear  [unfilled], [Consult Letter:] : I had the pleasure of evaluating your patient, [unfilled]. [Please see my note below.] : Please see my note below. [Consult Closing:] : Thank you very much for allowing me to participate in the care of this patient.  If you have any questions, please do not hesitate to contact me. [Sincerely,] : Sincerely, [DrSekou  ___] : Dr. BECERRA [FreeTextEntry2] : Cornelius Gonzales MD\par [facility]\par [phone] PHONE\par  [FreeTextEntry3] : Edis Mckeon DO\par Attending Physician,\par Hematology/ Medical Oncology\par [phone] office\par \par

## 2022-11-02 NOTE — HISTORY OF PRESENT ILLNESS
[de-identified] : 59 yo man with Crohn's disease and ankylosing spondylitis has chronic iron deficiency anemia. His hg in Oct'2017 was 12g and in June 2018 11g; S/p 5 doses of venofer completed on 11.23.18. [de-identified] : 6/12/19\par He is here for follow-up of KAT.  He completed venofer x 5 in 11/2018.  Most recent CBC is stable with Hgb 12.0g/dL, ferritin 34 and ironsat 17%.  He denies any CP, SOB, fatigue or bleeding.  \par \par 7/24/19\par Patient is here for a follow-up visit of KAT.  \par \par 4/21/21\par Patient is here for a follow-up visit for KAT.  He is feeling well with no new complaints.  Reviewed most recent CBC which shows stable anemia at 13.3g/dL with normal iron stores.  He does not take oral iron.  Patient denies fever, chills or bleeding.  He states he follows with GI for management Crohns disease, but it is stable so he has not seen them in a while.  \par \par 11/2/22\par Patient is here for a follow-up visit for KAT.  He is feeling well with no new complaints.  Reviewed most recent CBC which shows stable anemia at 12.1g/dL with drop in iron saturation; ferritin not drawn.  He does not take oral iron.  Patient denies fever, chills or bleeding.  He Crohns disease is managed by Dr. Ruiz; disease is stable on Humara.  Last colonoscopy was in 2021.

## 2022-11-03 LAB — FERRITIN SERPL-MCNC: 24 NG/ML

## 2022-11-21 ENCOUNTER — APPOINTMENT (OUTPATIENT)
Dept: INFUSION THERAPY | Facility: CLINIC | Age: 62
End: 2022-11-21

## 2022-11-21 RX ORDER — IRON SUCROSE 20 MG/ML
200 INJECTION, SOLUTION INTRAVENOUS ONCE
Refills: 0 | Status: COMPLETED | OUTPATIENT
Start: 2022-11-21 | End: 2022-11-21

## 2022-11-21 RX ADMIN — IRON SUCROSE 200 MILLIGRAM(S): 20 INJECTION, SOLUTION INTRAVENOUS at 09:55

## 2022-11-21 RX ADMIN — IRON SUCROSE 220 MILLIGRAM(S): 20 INJECTION, SOLUTION INTRAVENOUS at 09:24

## 2022-11-28 ENCOUNTER — APPOINTMENT (OUTPATIENT)
Dept: INFUSION THERAPY | Facility: CLINIC | Age: 62
End: 2022-11-28

## 2022-11-28 RX ORDER — IRON SUCROSE 20 MG/ML
200 INJECTION, SOLUTION INTRAVENOUS ONCE
Refills: 0 | Status: COMPLETED | OUTPATIENT
Start: 2022-11-28 | End: 2022-11-28

## 2022-11-28 RX ADMIN — IRON SUCROSE 220 MILLIGRAM(S): 20 INJECTION, SOLUTION INTRAVENOUS at 16:08

## 2022-12-05 ENCOUNTER — APPOINTMENT (OUTPATIENT)
Dept: INFUSION THERAPY | Facility: CLINIC | Age: 62
End: 2022-12-05

## 2022-12-05 RX ORDER — IRON SUCROSE 20 MG/ML
200 INJECTION, SOLUTION INTRAVENOUS ONCE
Refills: 0 | Status: COMPLETED | OUTPATIENT
Start: 2022-12-05 | End: 2022-12-05

## 2022-12-05 RX ADMIN — IRON SUCROSE 220 MILLIGRAM(S): 20 INJECTION, SOLUTION INTRAVENOUS at 13:58

## 2022-12-12 ENCOUNTER — APPOINTMENT (OUTPATIENT)
Dept: INFUSION THERAPY | Facility: CLINIC | Age: 62
End: 2022-12-12

## 2022-12-12 RX ORDER — IRON SUCROSE 20 MG/ML
200 INJECTION, SOLUTION INTRAVENOUS ONCE
Refills: 0 | Status: COMPLETED | OUTPATIENT
Start: 2022-12-12 | End: 2022-12-12

## 2022-12-12 RX ADMIN — IRON SUCROSE 220 MILLIGRAM(S): 20 INJECTION, SOLUTION INTRAVENOUS at 14:25

## 2022-12-19 ENCOUNTER — APPOINTMENT (OUTPATIENT)
Dept: INFUSION THERAPY | Facility: CLINIC | Age: 62
End: 2022-12-19

## 2022-12-19 RX ORDER — IRON SUCROSE 20 MG/ML
200 INJECTION, SOLUTION INTRAVENOUS ONCE
Refills: 0 | Status: COMPLETED | OUTPATIENT
Start: 2022-12-19 | End: 2022-12-19

## 2022-12-19 RX ADMIN — IRON SUCROSE 220 MILLIGRAM(S): 20 INJECTION, SOLUTION INTRAVENOUS at 15:20

## 2023-01-02 ENCOUNTER — TRANSCRIPTION ENCOUNTER (OUTPATIENT)
Age: 63
End: 2023-01-02

## 2023-01-20 ENCOUNTER — LABORATORY RESULT (OUTPATIENT)
Age: 63
End: 2023-01-20

## 2023-01-20 ENCOUNTER — APPOINTMENT (OUTPATIENT)
Dept: HEMATOLOGY ONCOLOGY | Facility: CLINIC | Age: 63
End: 2023-01-20

## 2023-01-20 ENCOUNTER — OUTPATIENT (OUTPATIENT)
Dept: OUTPATIENT SERVICES | Facility: HOSPITAL | Age: 63
LOS: 1 days | End: 2023-01-20

## 2023-01-20 DIAGNOSIS — D50.9 IRON DEFICIENCY ANEMIA, UNSPECIFIED: ICD-10-CM

## 2023-01-20 LAB
HCT VFR BLD CALC: 41.4 %
HGB BLD-MCNC: 13.5 G/DL
MCHC RBC-ENTMCNC: 28.1 PG
MCHC RBC-ENTMCNC: 32.6 G/DL
MCV RBC AUTO: 86.3 FL
PLATELET # BLD AUTO: 267 K/UL
PMV BLD: 8.4 FL
RBC # BLD: 4.8 M/UL
RBC # FLD: 13.3 %
WBC # FLD AUTO: 8.63 K/UL

## 2023-01-23 LAB
FERRITIN SERPL-MCNC: 327 NG/ML
IRON SATN MFR SERPL: 28 %
IRON SERPL-MCNC: 70 UG/DL
TIBC SERPL-MCNC: 249 UG/DL
UIBC SERPL-MCNC: 179 UG/DL

## 2023-02-14 ENCOUNTER — APPOINTMENT (OUTPATIENT)
Dept: HEMATOLOGY ONCOLOGY | Facility: CLINIC | Age: 63
End: 2023-02-14

## 2023-02-14 ENCOUNTER — OUTPATIENT (OUTPATIENT)
Dept: OUTPATIENT SERVICES | Facility: HOSPITAL | Age: 63
LOS: 1 days | End: 2023-02-14
Payer: COMMERCIAL

## 2023-02-14 ENCOUNTER — APPOINTMENT (OUTPATIENT)
Dept: HEMATOLOGY ONCOLOGY | Facility: CLINIC | Age: 63
End: 2023-02-14
Payer: COMMERCIAL

## 2023-02-14 VITALS
HEART RATE: 78 BPM | TEMPERATURE: 98 F | BODY MASS INDEX: 26.06 KG/M2 | RESPIRATION RATE: 16 BRPM | DIASTOLIC BLOOD PRESSURE: 70 MMHG | HEIGHT: 67 IN | SYSTOLIC BLOOD PRESSURE: 125 MMHG | WEIGHT: 166 LBS

## 2023-02-14 DIAGNOSIS — M45.9 ANKYLOSING SPONDYLITIS OF UNSPECIFIED SITES IN SPINE: ICD-10-CM

## 2023-02-14 PROCEDURE — 99213 OFFICE O/P EST LOW 20 MIN: CPT

## 2023-02-14 NOTE — HISTORY OF PRESENT ILLNESS
[de-identified] : 59 yo man with Crohn's disease and ankylosing spondylitis has chronic iron deficiency anemia. His hg in Oct'2017 was 12g and in June 2018 11g; S/p 5 doses of venofer completed on 11.23.18. [de-identified] : 6/12/19\par He is here for follow-up of KAT.  He completed venofer x 5 in 11/2018.  Most recent CBC is stable with Hgb 12.0g/dL, ferritin 34 and ironsat 17%.  He denies any CP, SOB, fatigue or bleeding.  \par \par 7/24/19\par Patient is here for a follow-up visit of KAT.  \par \par 4/21/21\par Patient is here for a follow-up visit for KAT.  He is feeling well with no new complaints.  Reviewed most recent CBC which shows stable anemia at 13.3g/dL with normal iron stores.  He does not take oral iron.  Patient denies fever, chills or bleeding.  He states he follows with GI for management Crohns disease, but it is stable so he has not seen them in a while.  \par \par 11/2/22\par Patient is here for a follow-up visit for KAT.  He is feeling well with no new complaints.  Reviewed most recent CBC which shows stable anemia at 12.1g/dL with drop in iron saturation; ferritin not drawn.  He does not take oral iron.  Patient denies fever, chills or bleeding.  His Crohns disease is managed by Dr. Ruiz; disease is stable on Humara.  Last colonoscopy was in 2021.  \par \par 2/14/23\par Patient is here for a follow-up visit for KAT.  He completed IV Venofer on 12.19.2022.  Reviewed most recent CBC which shows stable anemia at 13.5g/dL ; ferritin 327, ironsat 28%.  He does not take oral iron.  Patient denies CP, palpitations, dizziness, changes in vision or bleeding.

## 2023-02-14 NOTE — CONSULT LETTER
[Dear  ___] : Dear  [unfilled], [Consult Letter:] : I had the pleasure of evaluating your patient, [unfilled]. [Please see my note below.] : Please see my note below. [Consult Closing:] : Thank you very much for allowing me to participate in the care of this patient.  If you have any questions, please do not hesitate to contact me. [Sincerely,] : Sincerely, [DrSekou  ___] : Dr. BECERRA [FreeTextEntry2] : Cornelius Gonzales MD\par [facility]\par [phone] PHONE\par  [FreeTextEntry3] : Edis Mckeon DO\par Attending Physician,\par Hematology/ Medical Oncology\par [phone] office\par \par

## 2023-02-14 NOTE — ASSESSMENT
[FreeTextEntry1] : # KAT due to crohn's disease; asymptomatic; hg >11g; ferritin decreased.\par \par - Advised not to take iron pills due to underlying Crohn's disease\par - s/p IV venofer 200mg x5 , completed 11/2018 + 08/2019 + 12/2022 with normalization of iron stores\par - followup with GI, Dr. Whitehead, as indicated for surveillance colonoscopy in light of ulcerative colitis; last colonoscopy 2021; recommend surveillance every 3 years - discuss w/ GI please\par - followup with [facility], Dr. Mclean, for management of Crohns (on Humira) + ankylosing spondylitis\par - no additional iron needed at this time ; if ferritin levels drop again, will plan to replete with IV venofer \par \par RTC in 4 months with Smart NP with CBC, iron studies, ferritin, Vitamin B12, folate, MMA level 1 week prior \par seen/examined w/ NP Shakira; note reviewed;case discused

## 2023-02-14 NOTE — PHYSICAL EXAM
[Restricted in physically strenuous activity but ambulatory and able to carry out work of a light or sedentary nature] : Status 1- Restricted in physically strenuous activity but ambulatory and able to carry out work of a light or sedentary nature, e.g., light house work, office work [Normal] : affect appropriate [de-identified] : wearing glasses

## 2023-02-15 DIAGNOSIS — D50.9 IRON DEFICIENCY ANEMIA, UNSPECIFIED: ICD-10-CM

## 2023-02-15 DIAGNOSIS — M45.9 ANKYLOSING SPONDYLITIS OF UNSPECIFIED SITES IN SPINE: ICD-10-CM

## 2023-03-08 ENCOUNTER — APPOINTMENT (OUTPATIENT)
Dept: HEMATOLOGY ONCOLOGY | Facility: CLINIC | Age: 63
End: 2023-03-08

## 2023-06-05 ENCOUNTER — APPOINTMENT (OUTPATIENT)
Dept: HEMATOLOGY ONCOLOGY | Facility: CLINIC | Age: 63
End: 2023-06-05

## 2023-06-06 ENCOUNTER — APPOINTMENT (OUTPATIENT)
Dept: HEMATOLOGY ONCOLOGY | Facility: CLINIC | Age: 63
End: 2023-06-06

## 2023-06-06 ENCOUNTER — OUTPATIENT (OUTPATIENT)
Dept: OUTPATIENT SERVICES | Facility: HOSPITAL | Age: 63
LOS: 1 days | End: 2023-06-06
Payer: COMMERCIAL

## 2023-06-06 ENCOUNTER — LABORATORY RESULT (OUTPATIENT)
Age: 63
End: 2023-06-06

## 2023-06-06 DIAGNOSIS — M45.9 ANKYLOSING SPONDYLITIS OF UNSPECIFIED SITES IN SPINE: ICD-10-CM

## 2023-06-06 DIAGNOSIS — D50.9 IRON DEFICIENCY ANEMIA, UNSPECIFIED: ICD-10-CM

## 2023-06-06 LAB
HCT VFR BLD CALC: 40.8 %
HGB BLD-MCNC: 13.3 G/DL
MCHC RBC-ENTMCNC: 28.7 PG
MCHC RBC-ENTMCNC: 32.6 G/DL
MCV RBC AUTO: 87.9 FL
PLATELET # BLD AUTO: 238 K/UL
PMV BLD: 8.8 FL
RBC # BLD: 4.64 M/UL
RBC # FLD: 13 %
WBC # FLD AUTO: 6.48 K/UL

## 2023-06-06 PROCEDURE — 85027 COMPLETE CBC AUTOMATED: CPT

## 2023-06-06 PROCEDURE — 83550 IRON BINDING TEST: CPT

## 2023-06-06 PROCEDURE — 83540 ASSAY OF IRON: CPT

## 2023-06-06 PROCEDURE — 82728 ASSAY OF FERRITIN: CPT

## 2023-06-06 PROCEDURE — 36415 COLL VENOUS BLD VENIPUNCTURE: CPT

## 2023-06-07 ENCOUNTER — APPOINTMENT (OUTPATIENT)
Dept: HEMATOLOGY ONCOLOGY | Facility: CLINIC | Age: 63
End: 2023-06-07
Payer: COMMERCIAL

## 2023-06-07 ENCOUNTER — OUTPATIENT (OUTPATIENT)
Dept: OUTPATIENT SERVICES | Facility: HOSPITAL | Age: 63
LOS: 1 days | End: 2023-06-07
Payer: COMMERCIAL

## 2023-06-07 VITALS
HEART RATE: 74 BPM | BODY MASS INDEX: 26.21 KG/M2 | TEMPERATURE: 98 F | WEIGHT: 167 LBS | SYSTOLIC BLOOD PRESSURE: 140 MMHG | DIASTOLIC BLOOD PRESSURE: 101 MMHG | HEIGHT: 67 IN | RESPIRATION RATE: 16 BRPM

## 2023-06-07 DIAGNOSIS — M45.9 ANKYLOSING SPONDYLITIS OF UNSPECIFIED SITES IN SPINE: ICD-10-CM

## 2023-06-07 DIAGNOSIS — D50.9 IRON DEFICIENCY ANEMIA, UNSPECIFIED: ICD-10-CM

## 2023-06-07 DIAGNOSIS — K50.90 CROHN'S DISEASE, UNSPECIFIED, W/OUT COMPLICATIONS: ICD-10-CM

## 2023-06-07 LAB
FERRITIN SERPL-MCNC: 174 NG/ML
IRON SATN MFR SERPL: 16 %
IRON SERPL-MCNC: 45 UG/DL
TIBC SERPL-MCNC: 283 UG/DL
UIBC SERPL-MCNC: 238 UG/DL

## 2023-06-07 PROCEDURE — 99213 OFFICE O/P EST LOW 20 MIN: CPT

## 2023-06-07 NOTE — ASSESSMENT
[FreeTextEntry1] : # KAT due to crohn's disease; asymptomatic; hg >11g; ferritin decreased.\par \par - Advised not to take iron pills due to underlying Crohn's disease\par - s/p IV venofer 200mg x5 , completed 11/2018 + 08/2019 + 12/2022 with normalization of iron stores\par - followup with GI, Dr. Whitehead, as indicated for surveillance colonoscopy in light of ulcerative colitis; last colonoscopy 2021; recommend surveillance every 3 years - discuss w/ GI please\par - followup with [facility], Dr. Mclean, for management of Crohns (on Humira) + ankylosing spondylitis\par - no additional iron needed at this time ; if ferritin levels drop again, will plan to replete with IV venofer \par \par RTC in 4 months with Smart NP with CBC, iron studies, ferritin, Vitamin B12, folate, MMA level 1 week prior \par s

## 2023-06-07 NOTE — CONSULT LETTER
[Dear  ___] : Dear  [unfilled], [Consult Letter:] : I had the pleasure of evaluating your patient, [unfilled]. [Please see my note below.] : Please see my note below. [Consult Closing:] : Thank you very much for allowing me to participate in the care of this patient.  If you have any questions, please do not hesitate to contact me. [Sincerely,] : Sincerely, [DrSekou  ___] : Dr. BECERRA [FreeTextEntry2] : Cornelius Gonzales MD\par [facility]\par [phone] PHONE\par  [FreeTextEntry3] : Edis Mckeon DO\par Attending Physician,\par Hematology/ Medical Oncology\par [phone] office\par \par

## 2023-06-07 NOTE — HISTORY OF PRESENT ILLNESS
[de-identified] : 59 yo man with Crohn's disease and ankylosing spondylitis has chronic iron deficiency anemia. His hg in Oct'2017 was 12g and in June 2018 11g; S/p 5 doses of venofer completed on 11.23.18. [de-identified] : 6/12/19\par He is here for follow-up of KAT.  He completed venofer x 5 in 11/2018.  Most recent CBC is stable with Hgb 12.0g/dL, ferritin 34 and ironsat 17%.  He denies any CP, SOB, fatigue or bleeding.  \par \par 7/24/19\par Patient is here for a follow-up visit of KAT.  \par \par 4/21/21\par Patient is here for a follow-up visit for KAT.  He is feeling well with no new complaints.  Reviewed most recent CBC which shows stable anemia at 13.3g/dL with normal iron stores.  He does not take oral iron.  Patient denies fever, chills or bleeding.  He states he follows with GI for management Crohns disease, but it is stable so he has not seen them in a while.  \par \par 11/2/22\par Patient is here for a follow-up visit for KAT.  He is feeling well with no new complaints.  Reviewed most recent CBC which shows stable anemia at 12.1g/dL with drop in iron saturation; ferritin not drawn.  He does not take oral iron.  Patient denies fever, chills or bleeding.  His Crohns disease is managed by Dr. Ruiz; disease is stable on Humara.  Last colonoscopy was in 2021.  \par \par 2/14/23\par Patient is here for a follow-up visit for KAT.  He completed IV Venofer on 12.19.2022.  Reviewed most recent CBC which shows stable anemia at 13.5g/dL ; ferritin 327, ironsat 28%.  He does not take oral iron.  Patient denies CP, palpitations, dizziness, changes in vision or bleeding.

## 2023-06-08 DIAGNOSIS — K50.90 CROHN'S DISEASE, UNSPECIFIED, WITHOUT COMPLICATIONS: ICD-10-CM

## 2023-10-10 ENCOUNTER — OUTPATIENT (OUTPATIENT)
Dept: OUTPATIENT SERVICES | Facility: HOSPITAL | Age: 63
LOS: 1 days | End: 2023-10-10
Payer: COMMERCIAL

## 2023-10-10 ENCOUNTER — APPOINTMENT (OUTPATIENT)
Dept: HEMATOLOGY ONCOLOGY | Facility: CLINIC | Age: 63
End: 2023-10-10

## 2023-10-10 ENCOUNTER — LABORATORY RESULT (OUTPATIENT)
Age: 63
End: 2023-10-10

## 2023-10-10 DIAGNOSIS — D50.9 IRON DEFICIENCY ANEMIA, UNSPECIFIED: ICD-10-CM

## 2023-10-10 DIAGNOSIS — M45.9 ANKYLOSING SPONDYLITIS OF UNSPECIFIED SITES IN SPINE: ICD-10-CM

## 2023-10-10 DIAGNOSIS — K50.90 CROHN'S DISEASE, UNSPECIFIED, WITHOUT COMPLICATIONS: ICD-10-CM

## 2023-10-10 LAB
HCT VFR BLD CALC: 38 %
HGB BLD-MCNC: 12.3 G/DL
MCHC RBC-ENTMCNC: 28.3 PG
MCHC RBC-ENTMCNC: 32.4 G/DL
MCV RBC AUTO: 87.6 FL
PLATELET # BLD AUTO: 239 K/UL
PMV BLD: 8.5 FL
RBC # BLD: 4.34 M/UL
RBC # FLD: 13.2 %
WBC # FLD AUTO: 6.62 K/UL

## 2023-10-10 PROCEDURE — 36415 COLL VENOUS BLD VENIPUNCTURE: CPT

## 2023-10-10 PROCEDURE — 83540 ASSAY OF IRON: CPT

## 2023-10-10 PROCEDURE — 83550 IRON BINDING TEST: CPT

## 2023-10-10 PROCEDURE — 80048 BASIC METABOLIC PNL TOTAL CA: CPT

## 2023-10-10 PROCEDURE — 85027 COMPLETE CBC AUTOMATED: CPT

## 2023-10-10 PROCEDURE — 80076 HEPATIC FUNCTION PANEL: CPT

## 2023-10-10 PROCEDURE — 82728 ASSAY OF FERRITIN: CPT

## 2023-10-11 ENCOUNTER — OUTPATIENT (OUTPATIENT)
Dept: OUTPATIENT SERVICES | Facility: HOSPITAL | Age: 63
LOS: 1 days | End: 2023-10-11
Payer: COMMERCIAL

## 2023-10-11 ENCOUNTER — APPOINTMENT (OUTPATIENT)
Dept: HEMATOLOGY ONCOLOGY | Facility: CLINIC | Age: 63
End: 2023-10-11
Payer: COMMERCIAL

## 2023-10-11 VITALS
HEIGHT: 67 IN | BODY MASS INDEX: 27 KG/M2 | SYSTOLIC BLOOD PRESSURE: 131 MMHG | TEMPERATURE: 98.2 F | WEIGHT: 172 LBS | DIASTOLIC BLOOD PRESSURE: 94 MMHG | HEART RATE: 81 BPM | RESPIRATION RATE: 16 BRPM

## 2023-10-11 DIAGNOSIS — M45.9 ANKYLOSING SPONDYLITIS OF UNSPECIFIED SITES IN SPINE: ICD-10-CM

## 2023-10-11 DIAGNOSIS — K50.90 CROHN'S DISEASE, UNSPECIFIED, WITHOUT COMPLICATIONS: ICD-10-CM

## 2023-10-11 DIAGNOSIS — D50.9 IRON DEFICIENCY ANEMIA, UNSPECIFIED: ICD-10-CM

## 2023-10-11 LAB
ALBUMIN SERPL ELPH-MCNC: 3.5 G/DL
ALP BLD-CCNC: 65 U/L
ALT SERPL-CCNC: 8 U/L
ANION GAP SERPL CALC-SCNC: 11 MMOL/L
AST SERPL-CCNC: 14 U/L
BILIRUB DIRECT SERPL-MCNC: <0.2 MG/DL
BILIRUB INDIRECT SERPL-MCNC: >0 MG/DL
BILIRUB SERPL-MCNC: 0.2 MG/DL
BUN SERPL-MCNC: 11 MG/DL
CALCIUM SERPL-MCNC: 8.6 MG/DL
CHLORIDE SERPL-SCNC: 104 MMOL/L
CO2 SERPL-SCNC: 26 MMOL/L
CREAT SERPL-MCNC: 0.9 MG/DL
EGFR: 96 ML/MIN/1.73M2
FERRITIN SERPL-MCNC: 194 NG/ML
GLUCOSE SERPL-MCNC: 111 MG/DL
IRON SATN MFR SERPL: 24 %
IRON SERPL-MCNC: 64 UG/DL
POTASSIUM SERPL-SCNC: 3.6 MMOL/L
PROT SERPL-MCNC: 6.3 G/DL
SODIUM SERPL-SCNC: 141 MMOL/L
TIBC SERPL-MCNC: 269 UG/DL
UIBC SERPL-MCNC: 205 UG/DL

## 2023-10-11 PROCEDURE — 99214 OFFICE O/P EST MOD 30 MIN: CPT

## 2024-02-09 ENCOUNTER — OUTPATIENT (OUTPATIENT)
Dept: OUTPATIENT SERVICES | Facility: HOSPITAL | Age: 64
LOS: 1 days | End: 2024-02-09
Payer: COMMERCIAL

## 2024-02-09 ENCOUNTER — APPOINTMENT (OUTPATIENT)
Dept: HEMATOLOGY ONCOLOGY | Facility: CLINIC | Age: 64
End: 2024-02-09

## 2024-02-09 ENCOUNTER — LABORATORY RESULT (OUTPATIENT)
Age: 64
End: 2024-02-09

## 2024-02-09 DIAGNOSIS — D64.9 ANEMIA, UNSPECIFIED: ICD-10-CM

## 2024-02-09 LAB
HCT VFR BLD CALC: 37.8 %
HGB BLD-MCNC: 12.4 G/DL
MCHC RBC-ENTMCNC: 28.4 PG
MCHC RBC-ENTMCNC: 32.8 G/DL
MCV RBC AUTO: 86.5 FL
PLATELET # BLD AUTO: 237 K/UL
PMV BLD: 8.9 FL
RBC # BLD: 4.37 M/UL
RBC # FLD: 12.9 %
WBC # FLD AUTO: 7.84 K/UL

## 2024-02-09 PROCEDURE — 82746 ASSAY OF FOLIC ACID SERUM: CPT

## 2024-02-09 PROCEDURE — 82607 VITAMIN B-12: CPT

## 2024-02-09 PROCEDURE — 80048 BASIC METABOLIC PNL TOTAL CA: CPT

## 2024-02-09 PROCEDURE — 83550 IRON BINDING TEST: CPT

## 2024-02-09 PROCEDURE — 85027 COMPLETE CBC AUTOMATED: CPT

## 2024-02-09 PROCEDURE — 36415 COLL VENOUS BLD VENIPUNCTURE: CPT

## 2024-02-09 PROCEDURE — 80076 HEPATIC FUNCTION PANEL: CPT

## 2024-02-09 PROCEDURE — 83540 ASSAY OF IRON: CPT

## 2024-02-09 PROCEDURE — 82728 ASSAY OF FERRITIN: CPT

## 2024-02-10 DIAGNOSIS — D64.9 ANEMIA, UNSPECIFIED: ICD-10-CM

## 2024-02-10 LAB
ALBUMIN SERPL ELPH-MCNC: 3.4 G/DL
ALP BLD-CCNC: 63 U/L
ALT SERPL-CCNC: 8 U/L
ANION GAP SERPL CALC-SCNC: 9 MMOL/L
AST SERPL-CCNC: 12 U/L
BILIRUB DIRECT SERPL-MCNC: <0.2 MG/DL
BILIRUB INDIRECT SERPL-MCNC: >0.1 MG/DL
BILIRUB SERPL-MCNC: 0.3 MG/DL
BUN SERPL-MCNC: 15 MG/DL
CALCIUM SERPL-MCNC: 8.5 MG/DL
CHLORIDE SERPL-SCNC: 106 MMOL/L
CO2 SERPL-SCNC: 27 MMOL/L
CREAT SERPL-MCNC: 0.9 MG/DL
EGFR: 96 ML/MIN/1.73M2
FERRITIN SERPL-MCNC: 130 NG/ML
FOLATE SERPL-MCNC: 7.7 NG/ML
GLUCOSE SERPL-MCNC: 104 MG/DL
IRON SATN MFR SERPL: 19 %
IRON SERPL-MCNC: 53 UG/DL
POTASSIUM SERPL-SCNC: 3.9 MMOL/L
PROT SERPL-MCNC: 6.5 G/DL
SODIUM SERPL-SCNC: 142 MMOL/L
TIBC SERPL-MCNC: 286 UG/DL
UIBC SERPL-MCNC: 233 UG/DL
VIT B12 SERPL-MCNC: 901 PG/ML

## 2024-02-13 ENCOUNTER — APPOINTMENT (OUTPATIENT)
Dept: HEMATOLOGY ONCOLOGY | Facility: CLINIC | Age: 64
End: 2024-02-13

## 2024-02-27 ENCOUNTER — APPOINTMENT (OUTPATIENT)
Dept: HEMATOLOGY ONCOLOGY | Facility: CLINIC | Age: 64
End: 2024-02-27
Payer: COMMERCIAL

## 2024-02-27 ENCOUNTER — OUTPATIENT (OUTPATIENT)
Dept: OUTPATIENT SERVICES | Facility: HOSPITAL | Age: 64
LOS: 1 days | End: 2024-02-27
Payer: COMMERCIAL

## 2024-02-27 VITALS
RESPIRATION RATE: 16 BRPM | DIASTOLIC BLOOD PRESSURE: 88 MMHG | HEART RATE: 85 BPM | SYSTOLIC BLOOD PRESSURE: 138 MMHG | BODY MASS INDEX: 26.68 KG/M2 | WEIGHT: 170 LBS | TEMPERATURE: 98.2 F | HEIGHT: 67 IN

## 2024-02-27 DIAGNOSIS — D50.9 IRON DEFICIENCY ANEMIA, UNSPECIFIED: ICD-10-CM

## 2024-02-27 DIAGNOSIS — D64.9 ANEMIA, UNSPECIFIED: ICD-10-CM

## 2024-02-27 PROCEDURE — 99214 OFFICE O/P EST MOD 30 MIN: CPT

## 2024-02-27 NOTE — PHYSICAL EXAM
[Restricted in physically strenuous activity but ambulatory and able to carry out work of a light or sedentary nature] : Status 1- Restricted in physically strenuous activity but ambulatory and able to carry out work of a light or sedentary nature, e.g., light house work, office work [Normal] : affect appropriate [de-identified] : wearing glasses

## 2024-02-27 NOTE — CONSULT LETTER
[Dear  ___] : Dear  [unfilled], [Consult Letter:] : I had the pleasure of evaluating your patient, [unfilled]. [Please see my note below.] : Please see my note below. [Consult Closing:] : Thank you very much for allowing me to participate in the care of this patient.  If you have any questions, please do not hesitate to contact me. [Sincerely,] : Sincerely, [DrSekou  ___] : Dr. BECERRA [FreeTextEntry2] : Cornelius Gonzales MD\par  [facility]\par  [phone] PHONE\par   [FreeTextEntry3] : Edis Mckeon DO\par  Attending Physician,\par  Hematology/ Medical Oncology\par  [phone] office\par  \par

## 2024-02-27 NOTE — HISTORY OF PRESENT ILLNESS
[de-identified] : 59 yo man with Crohn's disease and ankylosing spondylitis has chronic iron deficiency anemia. His hg in Oct'2017 was 12g and in June 2018 11g; S/p 5 doses of venofer completed on 11.23.18. [de-identified] : 6/12/19 He is here for follow-up of KAT.  He completed venofer x 5 in 11/2018.  Most recent CBC is stable with Hgb 12.0g/dL, ferritin 34 and ironsat 17%.  He denies any CP, SOB, fatigue or bleeding.    7/24/19 Patient is here for a follow-up visit of KAT.    4/21/21 Patient is here for a follow-up visit for KAT.  He is feeling well with no new complaints.  Reviewed most recent CBC which shows stable anemia at 13.3g/dL with normal iron stores.  He does not take oral iron.  Patient denies fever, chills or bleeding.  He states he follows with GI for management Crohns disease, but it is stable so he has not seen them in a while.    11/2/22 Patient is here for a follow-up visit for KAT.  He is feeling well with no new complaints.  Reviewed most recent CBC which shows stable anemia at 12.1g/dL with drop in iron saturation; ferritin not drawn.  He does not take oral iron.  Patient denies fever, chills or bleeding.  His Crohns disease is managed by Dr. Ruiz; disease is stable on Humara.  Last colonoscopy was in 2021.    2/14/23 Patient is here for a follow-up visit for KAT.  He completed IV Venofer on 12.19.2022.  Reviewed most recent CBC which shows stable anemia at 13.5g/dL ; ferritin 327, ironsat 28%.  He does not take oral iron.  Patient denies CP, palpitations, dizziness, changes in vision or bleeding.    2/27/24 Patient is here for a follow-up visit for KAT.  He completed IV Venofer on 12.19.2022.  Reviewed most recent CBC which shows stable anemia with hgb 12.4g/dL ; ferritin 130, ironsat 19%, TIBC 286.  He does not take oral iron.  Patient denies CP, palpitations, dizziness, changes in vision or overt bleeding.  Last colonoscopy was about 2 years ago as per patient.

## 2024-02-27 NOTE — ASSESSMENT
[FreeTextEntry1] : # KAT due to crohn's disease; asymptomatic; hg >11g; ferritin decreased.  - Advised not to take iron pills due to underlying Crohn's disease - s/p IV venofer 200mg x5 , completed 11/2018 + 08/2019 + 12/2022 with normalization of iron stores - followup with GI, Dr. Whitehead, as indicated for surveillance colonoscopy in light of ulcerative colitis; last colonoscopy 2021; recommend surveillance every 3 years  - followup with [facility], Dr. Mclean, for management of Crohns (on Humira) + ankylosing spondylitis - no additional iron needed at this time ; if ferritin levels drop again, will plan to replete with IV venofer   RTC in 4 months with SMART NP with CBC, iron studies, ferritin level 2 days prior   SEEN/ examined w/ NP Shakira; note reviewed; case discussed; agree w/ plan

## 2024-07-08 ENCOUNTER — APPOINTMENT (OUTPATIENT)
Age: 64
End: 2024-07-08

## 2024-07-08 ENCOUNTER — OUTPATIENT (OUTPATIENT)
Dept: OUTPATIENT SERVICES | Facility: HOSPITAL | Age: 64
LOS: 1 days | End: 2024-07-08
Payer: COMMERCIAL

## 2024-07-08 ENCOUNTER — LABORATORY RESULT (OUTPATIENT)
Age: 64
End: 2024-07-08

## 2024-07-08 DIAGNOSIS — D50.9 IRON DEFICIENCY ANEMIA, UNSPECIFIED: ICD-10-CM

## 2024-07-08 LAB
ALBUMIN SERPL ELPH-MCNC: 3.4 G/DL
ALP BLD-CCNC: 71 U/L
ALT SERPL-CCNC: 11 U/L
ANION GAP SERPL CALC-SCNC: 10 MMOL/L
AST SERPL-CCNC: 16 U/L
BILIRUB DIRECT SERPL-MCNC: <0.2 MG/DL
BILIRUB INDIRECT SERPL-MCNC: >0.3 MG/DL
BILIRUB SERPL-MCNC: 0.5 MG/DL
CALCIUM SERPL-MCNC: 8.3 MG/DL
CHLORIDE SERPL-SCNC: 100 MMOL/L
CO2 SERPL-SCNC: 31 MMOL/L
CREAT SERPL-MCNC: 1 MG/DL
EGFR: 85 ML/MIN/1.73M2
GLUCOSE SERPL-MCNC: 107 MG/DL
HGB BLD-MCNC: 12.1 G/DL
IRON SATN MFR SERPL: 24 %
IRON SERPL-MCNC: 67 UG/DL
MCHC RBC-ENTMCNC: 28.5 PG
MCHC RBC-ENTMCNC: 33 G/DL
MCV RBC AUTO: 86.4 FL
PLATELET # BLD AUTO: 213 K/UL
PMV BLD: 8.9 FL
POTASSIUM SERPL-SCNC: 3 MMOL/L
RBC # BLD: 4.25 M/UL
RBC # FLD: 13.2 %
SODIUM SERPL-SCNC: 141 MMOL/L
TIBC SERPL-MCNC: 282 UG/DL
UIBC SERPL-MCNC: 215 UG/DL
WBC # FLD AUTO: 5.89 K/UL

## 2024-07-08 PROCEDURE — 82728 ASSAY OF FERRITIN: CPT

## 2024-07-08 PROCEDURE — 85027 COMPLETE CBC AUTOMATED: CPT

## 2024-07-08 PROCEDURE — 36415 COLL VENOUS BLD VENIPUNCTURE: CPT

## 2024-07-08 PROCEDURE — 80076 HEPATIC FUNCTION PANEL: CPT

## 2024-07-08 PROCEDURE — 80048 BASIC METABOLIC PNL TOTAL CA: CPT

## 2024-07-08 PROCEDURE — 83550 IRON BINDING TEST: CPT

## 2024-07-08 PROCEDURE — 83540 ASSAY OF IRON: CPT

## 2024-07-09 DIAGNOSIS — D50.9 IRON DEFICIENCY ANEMIA, UNSPECIFIED: ICD-10-CM

## 2024-07-09 LAB — FERRITIN SERPL-MCNC: 147 NG/ML

## 2024-07-11 ENCOUNTER — OUTPATIENT (OUTPATIENT)
Dept: OUTPATIENT SERVICES | Facility: HOSPITAL | Age: 64
LOS: 1 days | End: 2024-07-11
Payer: COMMERCIAL

## 2024-07-11 ENCOUNTER — APPOINTMENT (OUTPATIENT)
Age: 64
End: 2024-07-11
Payer: COMMERCIAL

## 2024-07-11 VITALS
HEART RATE: 98 BPM | HEIGHT: 66 IN | TEMPERATURE: 98.4 F | BODY MASS INDEX: 27 KG/M2 | DIASTOLIC BLOOD PRESSURE: 85 MMHG | RESPIRATION RATE: 16 BRPM | WEIGHT: 168 LBS | SYSTOLIC BLOOD PRESSURE: 121 MMHG | OXYGEN SATURATION: 99 %

## 2024-07-11 DIAGNOSIS — D50.9 IRON DEFICIENCY ANEMIA, UNSPECIFIED: ICD-10-CM

## 2024-07-11 PROCEDURE — 99214 OFFICE O/P EST MOD 30 MIN: CPT

## 2024-07-11 PROCEDURE — 80048 BASIC METABOLIC PNL TOTAL CA: CPT

## 2024-07-12 LAB
ANION GAP SERPL CALC-SCNC: 10 MMOL/L
BUN SERPL-MCNC: 15 MG/DL
CALCIUM SERPL-MCNC: 8.3 MG/DL
CHLORIDE SERPL-SCNC: 105 MMOL/L
CO2 SERPL-SCNC: 27 MMOL/L
CREAT SERPL-MCNC: 1.1 MG/DL
EGFR: 75 ML/MIN/1.73M2
GLUCOSE SERPL-MCNC: 97 MG/DL
SODIUM SERPL-SCNC: 142 MMOL/L

## 2024-10-11 ENCOUNTER — LABORATORY RESULT (OUTPATIENT)
Age: 64
End: 2024-10-11

## 2024-10-11 ENCOUNTER — APPOINTMENT (OUTPATIENT)
Age: 64
End: 2024-10-11

## 2024-10-11 ENCOUNTER — OUTPATIENT (OUTPATIENT)
Dept: OUTPATIENT SERVICES | Facility: HOSPITAL | Age: 64
LOS: 1 days | End: 2024-10-11
Payer: COMMERCIAL

## 2024-10-11 DIAGNOSIS — D50.9 IRON DEFICIENCY ANEMIA, UNSPECIFIED: ICD-10-CM

## 2024-10-11 LAB
HCT VFR BLD CALC: 37.6 %
HGB BLD-MCNC: 12.7 G/DL
MCHC RBC-ENTMCNC: 28.9 PG
MCHC RBC-ENTMCNC: 33.8 G/DL
MCV RBC AUTO: 85.6 FL
PLATELET # BLD AUTO: 243 K/UL
PMV BLD: 8.8 FL
RBC # BLD: 4.39 M/UL
RBC # FLD: 13.2 %
WBC # FLD AUTO: 7.75 K/UL

## 2024-10-11 PROCEDURE — 36415 COLL VENOUS BLD VENIPUNCTURE: CPT

## 2024-10-11 PROCEDURE — 83550 IRON BINDING TEST: CPT

## 2024-10-11 PROCEDURE — 80076 HEPATIC FUNCTION PANEL: CPT

## 2024-10-11 PROCEDURE — 85027 COMPLETE CBC AUTOMATED: CPT

## 2024-10-11 PROCEDURE — 82728 ASSAY OF FERRITIN: CPT

## 2024-10-11 PROCEDURE — 83540 ASSAY OF IRON: CPT

## 2024-10-11 PROCEDURE — 80048 BASIC METABOLIC PNL TOTAL CA: CPT

## 2024-10-12 DIAGNOSIS — D50.9 IRON DEFICIENCY ANEMIA, UNSPECIFIED: ICD-10-CM

## 2024-10-15 LAB
ALBUMIN SERPL ELPH-MCNC: 3.3 G/DL
ALP BLD-CCNC: 72 U/L
ALT SERPL-CCNC: 10 U/L
ANION GAP SERPL CALC-SCNC: 8 MMOL/L
AST SERPL-CCNC: 13 U/L
BILIRUB DIRECT SERPL-MCNC: <0.2 MG/DL
BILIRUB INDIRECT SERPL-MCNC: >0.2 MG/DL
BILIRUB SERPL-MCNC: 0.4 MG/DL
BUN SERPL-MCNC: 12 MG/DL
CALCIUM SERPL-MCNC: 8.5 MG/DL
CHLORIDE SERPL-SCNC: 104 MMOL/L
CO2 SERPL-SCNC: 25 MMOL/L
CREAT SERPL-MCNC: 0.8 MG/DL
EGFR: 99 ML/MIN/1.73M2
FERRITIN SERPL-MCNC: 96 NG/ML
GLUCOSE SERPL-MCNC: 107 MG/DL
IRON SATN MFR SERPL: 18 %
IRON SERPL-MCNC: 52 UG/DL
POTASSIUM SERPL-SCNC: 3.8 MMOL/L
PROT SERPL-MCNC: 6.4 G/DL
SODIUM SERPL-SCNC: 137 MMOL/L
TIBC SERPL-MCNC: 291 UG/DL
UIBC SERPL-MCNC: 239 UG/DL

## 2024-10-16 ENCOUNTER — OUTPATIENT (OUTPATIENT)
Dept: OUTPATIENT SERVICES | Facility: HOSPITAL | Age: 64
LOS: 1 days | End: 2024-10-16
Payer: COMMERCIAL

## 2024-10-16 ENCOUNTER — APPOINTMENT (OUTPATIENT)
Age: 64
End: 2024-10-16
Payer: COMMERCIAL

## 2024-10-16 VITALS
HEART RATE: 90 BPM | OXYGEN SATURATION: 100 % | TEMPERATURE: 98.3 F | BODY MASS INDEX: 26.86 KG/M2 | DIASTOLIC BLOOD PRESSURE: 89 MMHG | WEIGHT: 171.13 LBS | HEIGHT: 67 IN | SYSTOLIC BLOOD PRESSURE: 153 MMHG

## 2024-10-16 DIAGNOSIS — D50.9 IRON DEFICIENCY ANEMIA, UNSPECIFIED: ICD-10-CM

## 2024-10-16 PROCEDURE — 99214 OFFICE O/P EST MOD 30 MIN: CPT

## 2024-10-16 PROCEDURE — G2211 COMPLEX E/M VISIT ADD ON: CPT

## 2024-12-11 ENCOUNTER — APPOINTMENT (OUTPATIENT)
Dept: NEUROLOGY | Facility: CLINIC | Age: 64
End: 2024-12-11
Payer: COMMERCIAL

## 2024-12-11 VITALS — DIASTOLIC BLOOD PRESSURE: 89 MMHG | SYSTOLIC BLOOD PRESSURE: 138 MMHG | HEART RATE: 77 BPM

## 2024-12-11 VITALS — WEIGHT: 170 LBS | HEIGHT: 68 IN | BODY MASS INDEX: 25.76 KG/M2

## 2024-12-11 DIAGNOSIS — M48.17: ICD-10-CM

## 2024-12-11 DIAGNOSIS — M48.061 SPINAL STENOSIS, LUMBAR REGION WITHOUT NEUROGENIC CLAUDICATION: ICD-10-CM

## 2024-12-11 PROCEDURE — 99203 OFFICE O/P NEW LOW 30 MIN: CPT

## 2024-12-13 ENCOUNTER — APPOINTMENT (OUTPATIENT)
Dept: NEUROLOGY | Facility: CLINIC | Age: 64
End: 2024-12-13
Payer: COMMERCIAL

## 2024-12-13 PROCEDURE — 95912 NRV CNDJ TEST 11-12 STUDIES: CPT

## 2024-12-13 PROCEDURE — 95886 MUSC TEST DONE W/N TEST COMP: CPT

## 2025-01-13 ENCOUNTER — OUTPATIENT (OUTPATIENT)
Dept: OUTPATIENT SERVICES | Facility: HOSPITAL | Age: 65
LOS: 1 days | End: 2025-01-13
Payer: COMMERCIAL

## 2025-01-13 ENCOUNTER — APPOINTMENT (OUTPATIENT)
Age: 65
End: 2025-01-13

## 2025-01-13 ENCOUNTER — LABORATORY RESULT (OUTPATIENT)
Age: 65
End: 2025-01-13

## 2025-01-13 DIAGNOSIS — D50.9 IRON DEFICIENCY ANEMIA, UNSPECIFIED: ICD-10-CM

## 2025-01-13 LAB
ALBUMIN SERPL ELPH-MCNC: 3.4 G/DL
ALP BLD-CCNC: 68 U/L
ALT SERPL-CCNC: 11 U/L
ANION GAP SERPL CALC-SCNC: 10 MMOL/L
AST SERPL-CCNC: 18 U/L
BILIRUB DIRECT SERPL-MCNC: 0.2 MG/DL
BILIRUB INDIRECT SERPL-MCNC: 0.3 MG/DL
BILIRUB SERPL-MCNC: 0.5 MG/DL
BUN SERPL-MCNC: 14 MG/DL
CALCIUM SERPL-MCNC: 8.5 MG/DL
CHLORIDE SERPL-SCNC: 103 MMOL/L
CO2 SERPL-SCNC: 27 MMOL/L
CREAT SERPL-MCNC: 1 MG/DL
EGFR: 84 ML/MIN/1.73M2
GLUCOSE SERPL-MCNC: 102 MG/DL
HCT VFR BLD CALC: 37.9 %
HGB BLD-MCNC: 12.5 G/DL
IRON SATN MFR SERPL: 24 %
IRON SERPL-MCNC: 69 UG/DL
MCHC RBC-ENTMCNC: 28.5 PG
MCHC RBC-ENTMCNC: 33 G/DL
MCV RBC AUTO: 86.5 FL
PLATELET # BLD AUTO: 252 K/UL
PMV BLD: 8.3 FL
POTASSIUM SERPL-SCNC: 3.9 MMOL/L
PROT SERPL-MCNC: 6.6 G/DL
RBC # BLD: 4.38 M/UL
RBC # FLD: 14.4 %
SODIUM SERPL-SCNC: 140 MMOL/L
TIBC SERPL-MCNC: 282 UG/DL
UIBC SERPL-MCNC: 213 UG/DL
WBC # FLD AUTO: 8.28 K/UL

## 2025-01-13 PROCEDURE — 82728 ASSAY OF FERRITIN: CPT

## 2025-01-13 PROCEDURE — 83550 IRON BINDING TEST: CPT

## 2025-01-13 PROCEDURE — 80048 BASIC METABOLIC PNL TOTAL CA: CPT

## 2025-01-13 PROCEDURE — 80076 HEPATIC FUNCTION PANEL: CPT

## 2025-01-13 PROCEDURE — 36415 COLL VENOUS BLD VENIPUNCTURE: CPT

## 2025-01-13 PROCEDURE — 83540 ASSAY OF IRON: CPT

## 2025-01-13 PROCEDURE — 85027 COMPLETE CBC AUTOMATED: CPT

## 2025-01-14 ENCOUNTER — APPOINTMENT (OUTPATIENT)
Dept: NEUROLOGY | Facility: CLINIC | Age: 65
End: 2025-01-14

## 2025-01-14 DIAGNOSIS — D50.9 IRON DEFICIENCY ANEMIA, UNSPECIFIED: ICD-10-CM

## 2025-01-14 LAB — FERRITIN SERPL-MCNC: 119 NG/ML

## 2025-01-20 ENCOUNTER — APPOINTMENT (OUTPATIENT)
Age: 65
End: 2025-01-20
Payer: COMMERCIAL

## 2025-01-20 ENCOUNTER — OUTPATIENT (OUTPATIENT)
Dept: OUTPATIENT SERVICES | Facility: HOSPITAL | Age: 65
LOS: 1 days | End: 2025-01-20
Payer: COMMERCIAL

## 2025-01-20 VITALS
TEMPERATURE: 97.8 F | HEIGHT: 68 IN | SYSTOLIC BLOOD PRESSURE: 157 MMHG | WEIGHT: 166 LBS | DIASTOLIC BLOOD PRESSURE: 89 MMHG | BODY MASS INDEX: 25.16 KG/M2 | RESPIRATION RATE: 16 BRPM | HEART RATE: 89 BPM | OXYGEN SATURATION: 99 %

## 2025-01-20 DIAGNOSIS — D50.9 IRON DEFICIENCY ANEMIA, UNSPECIFIED: ICD-10-CM

## 2025-01-20 PROCEDURE — 99213 OFFICE O/P EST LOW 20 MIN: CPT

## 2025-01-24 ENCOUNTER — APPOINTMENT (OUTPATIENT)
Dept: NEUROLOGY | Facility: CLINIC | Age: 65
End: 2025-01-24
Payer: COMMERCIAL

## 2025-01-24 VITALS — HEART RATE: 90 BPM | DIASTOLIC BLOOD PRESSURE: 88 MMHG | SYSTOLIC BLOOD PRESSURE: 136 MMHG

## 2025-01-24 VITALS — BODY MASS INDEX: 25.16 KG/M2 | HEIGHT: 68 IN | WEIGHT: 166 LBS

## 2025-01-24 DIAGNOSIS — R29.898 OTHER SYMPTOMS AND SIGNS INVOLVING THE MUSCULOSKELETAL SYSTEM: ICD-10-CM

## 2025-01-24 PROCEDURE — 99213 OFFICE O/P EST LOW 20 MIN: CPT

## 2025-01-24 PROCEDURE — 99214 OFFICE O/P EST MOD 30 MIN: CPT

## 2025-02-11 DIAGNOSIS — R93.7 ABNORMAL FINDINGS ON DIAGNOSTIC IMAGING OF OTHER PARTS OF MUSCULOSKELETAL SYSTEM: ICD-10-CM

## 2025-03-04 ENCOUNTER — APPOINTMENT (OUTPATIENT)
Dept: NEUROLOGY | Facility: CLINIC | Age: 65
End: 2025-03-04
Payer: COMMERCIAL

## 2025-03-04 VITALS — WEIGHT: 167 LBS | HEIGHT: 67 IN | BODY MASS INDEX: 26.21 KG/M2

## 2025-03-04 DIAGNOSIS — R93.7 ABNORMAL FINDINGS ON DIAGNOSTIC IMAGING OF OTHER PARTS OF MUSCULOSKELETAL SYSTEM: ICD-10-CM

## 2025-03-04 DIAGNOSIS — R29.898 OTHER SYMPTOMS AND SIGNS INVOLVING THE MUSCULOSKELETAL SYSTEM: ICD-10-CM

## 2025-03-04 DIAGNOSIS — M45.9 ANKYLOSING SPONDYLITIS OF UNSPECIFIED SITES IN SPINE: ICD-10-CM

## 2025-03-04 PROCEDURE — 99213 OFFICE O/P EST LOW 20 MIN: CPT

## 2025-03-04 RX ORDER — FOLIC ACID 1 MG/1
1 TABLET ORAL
Refills: 0 | Status: ACTIVE | COMMUNITY

## 2025-04-17 ENCOUNTER — APPOINTMENT (OUTPATIENT)
Age: 65
End: 2025-04-17

## 2025-04-21 ENCOUNTER — APPOINTMENT (OUTPATIENT)
Age: 65
End: 2025-04-21

## 2025-04-22 ENCOUNTER — APPOINTMENT (OUTPATIENT)
Dept: NEUROLOGY | Facility: CLINIC | Age: 65
End: 2025-04-22
Payer: COMMERCIAL

## 2025-04-22 VITALS — HEIGHT: 67 IN | WEIGHT: 176 LBS | BODY MASS INDEX: 27.62 KG/M2

## 2025-04-22 DIAGNOSIS — R29.898 OTHER SYMPTOMS AND SIGNS INVOLVING THE MUSCULOSKELETAL SYSTEM: ICD-10-CM

## 2025-04-22 DIAGNOSIS — R93.7 ABNORMAL FINDINGS ON DIAGNOSTIC IMAGING OF OTHER PARTS OF MUSCULOSKELETAL SYSTEM: ICD-10-CM

## 2025-04-22 PROCEDURE — 99213 OFFICE O/P EST LOW 20 MIN: CPT

## 2025-04-22 RX ORDER — UPADACITINIB 45 MG/1
45 TABLET, EXTENDED RELEASE ORAL
Refills: 0 | Status: ACTIVE | COMMUNITY

## 2025-05-30 ENCOUNTER — APPOINTMENT (OUTPATIENT)
Age: 65
End: 2025-05-30

## 2025-05-30 ENCOUNTER — OUTPATIENT (OUTPATIENT)
Dept: OUTPATIENT SERVICES | Facility: HOSPITAL | Age: 65
LOS: 1 days | End: 2025-05-30
Payer: COMMERCIAL

## 2025-05-30 DIAGNOSIS — D50.9 IRON DEFICIENCY ANEMIA, UNSPECIFIED: ICD-10-CM

## 2025-05-30 PROCEDURE — 83550 IRON BINDING TEST: CPT

## 2025-05-30 PROCEDURE — 83540 ASSAY OF IRON: CPT

## 2025-05-30 PROCEDURE — 80076 HEPATIC FUNCTION PANEL: CPT

## 2025-05-30 PROCEDURE — 80048 BASIC METABOLIC PNL TOTAL CA: CPT

## 2025-05-30 PROCEDURE — 85025 COMPLETE CBC W/AUTO DIFF WBC: CPT

## 2025-05-30 PROCEDURE — 36415 COLL VENOUS BLD VENIPUNCTURE: CPT

## 2025-05-30 PROCEDURE — 82728 ASSAY OF FERRITIN: CPT

## 2025-05-31 DIAGNOSIS — D50.9 IRON DEFICIENCY ANEMIA, UNSPECIFIED: ICD-10-CM

## 2025-06-02 ENCOUNTER — OUTPATIENT (OUTPATIENT)
Dept: OUTPATIENT SERVICES | Facility: HOSPITAL | Age: 65
LOS: 1 days | End: 2025-06-02
Payer: COMMERCIAL

## 2025-06-02 ENCOUNTER — APPOINTMENT (OUTPATIENT)
Age: 65
End: 2025-06-02
Payer: COMMERCIAL

## 2025-06-02 VITALS
HEIGHT: 67 IN | TEMPERATURE: 97.6 F | DIASTOLIC BLOOD PRESSURE: 82 MMHG | BODY MASS INDEX: 24.96 KG/M2 | WEIGHT: 159 LBS | RESPIRATION RATE: 16 BRPM | HEART RATE: 81 BPM | SYSTOLIC BLOOD PRESSURE: 128 MMHG

## 2025-06-02 DIAGNOSIS — D50.9 IRON DEFICIENCY ANEMIA, UNSPECIFIED: ICD-10-CM

## 2025-06-02 LAB
ALBUMIN SERPL ELPH-MCNC: 3 G/DL
ALP BLD-CCNC: 78 U/L
ALT SERPL-CCNC: 22 U/L
ANION GAP SERPL CALC-SCNC: 11 MMOL/L
AST SERPL-CCNC: 25 U/L
AUTO BASOPHILS #: 0.02 K/UL
AUTO BASOPHILS %: 0.3 %
AUTO EOSINOPHILS #: 0.03 K/UL
AUTO EOSINOPHILS %: 0.4 %
AUTO IMMATURE GRANULOCYTES #: 0.04 K/UL
AUTO LYMPHOCYTES #: 1.9 K/UL
AUTO LYMPHOCYTES %: 28.4 %
AUTO MONOCYTES #: 0.73 K/UL
AUTO MONOCYTES %: 10.9 %
AUTO NEUTROPHILS #: 3.98 K/UL
AUTO NEUTROPHILS %: 59.4 %
AUTO NRBC #: 0 K/UL
BILIRUB DIRECT SERPL-MCNC: 0.2 MG/DL
BILIRUB INDIRECT SERPL-MCNC: 0.2 MG/DL
BILIRUB SERPL-MCNC: 0.4 MG/DL
BUN SERPL-MCNC: 11 MG/DL
CALCIUM SERPL-MCNC: 7.7 MG/DL
CHLORIDE SERPL-SCNC: 100 MMOL/L
CO2 SERPL-SCNC: 29 MMOL/L
CREAT SERPL-MCNC: 0.9 MG/DL
EGFRCR SERPLBLD CKD-EPI 2021: 95 ML/MIN/1.73M2
FERRITIN SERPL-MCNC: 286 NG/ML
GLUCOSE SERPL-MCNC: 98 MG/DL
HCT VFR BLD CALC: 30.5 %
HGB BLD-MCNC: 10.3 G/DL
IMM GRANULOCYTES NFR BLD AUTO: 0.6 %
IRON SATN MFR SERPL: 19 %
IRON SERPL-MCNC: 39 UG/DL
MAN DIFF?: NORMAL
MCHC RBC-ENTMCNC: 29.3 PG
MCHC RBC-ENTMCNC: 33.8 G/DL
MCV RBC AUTO: 86.9 FL
PLATELET # BLD AUTO: 200 K/UL
PMV BLD AUTO: 0 /100 WBCS
PMV BLD: 8.3 FL
POTASSIUM SERPL-SCNC: 3.5 MMOL/L
PROT SERPL-MCNC: 6 G/DL
RBC # BLD: 3.51 M/UL
RBC # FLD: 13.9 %
SODIUM SERPL-SCNC: 140 MMOL/L
TIBC SERPL-MCNC: 201 UG/DL
UIBC SERPL-MCNC: 162 UG/DL
WBC # FLD AUTO: 6.7 K/UL

## 2025-06-02 PROCEDURE — 99214 OFFICE O/P EST MOD 30 MIN: CPT

## 2025-06-03 DIAGNOSIS — D50.9 IRON DEFICIENCY ANEMIA, UNSPECIFIED: ICD-10-CM

## 2025-06-23 ENCOUNTER — APPOINTMENT (OUTPATIENT)
Age: 65
End: 2025-06-23

## 2025-06-23 LAB
AUTO BASOPHILS #: 0.01 K/UL
AUTO BASOPHILS %: 0.2 %
AUTO EOSINOPHILS #: 0.04 K/UL
AUTO EOSINOPHILS %: 0.7 %
AUTO IMMATURE GRANULOCYTES #: 0.07 K/UL
AUTO LYMPHOCYTES #: 3.35 K/UL
AUTO LYMPHOCYTES %: 55.4 %
AUTO MONOCYTES #: 0.75 K/UL
AUTO MONOCYTES %: 12.4 %
AUTO NEUTROPHILS #: 1.83 K/UL
AUTO NEUTROPHILS %: 30.1 %
AUTO NRBC #: 0 K/UL
HCT VFR BLD CALC: 32.4 %
HGB BLD-MCNC: 10.8 G/DL
IMM GRANULOCYTES NFR BLD AUTO: 1.2 %
MAN DIFF?: NORMAL
MCHC RBC-ENTMCNC: 29.2 PG
MCHC RBC-ENTMCNC: 33.3 G/DL
MCV RBC AUTO: 87.6 FL
PLATELET # BLD AUTO: 300 K/UL
PMV BLD AUTO: 0 /100 WBCS
PMV BLD: 8.3 FL
RBC # BLD: 3.7 M/UL
RBC # FLD: 15 %
WBC # FLD AUTO: 6.05 K/UL

## 2025-06-24 LAB
FERRITIN SERPL-MCNC: 244 NG/ML
IRON SATN MFR SERPL: 47 %
IRON SERPL-MCNC: 149 UG/DL
TIBC SERPL-MCNC: 320 UG/DL
UIBC SERPL-MCNC: 171 UG/DL

## 2025-06-26 ENCOUNTER — APPOINTMENT (OUTPATIENT)
Age: 65
End: 2025-06-26
Payer: COMMERCIAL

## 2025-06-26 VITALS
TEMPERATURE: 97.8 F | HEART RATE: 81 BPM | DIASTOLIC BLOOD PRESSURE: 82 MMHG | SYSTOLIC BLOOD PRESSURE: 128 MMHG | OXYGEN SATURATION: 100 % | WEIGHT: 162 LBS | RESPIRATION RATE: 16 BRPM | BODY MASS INDEX: 25.43 KG/M2 | HEIGHT: 67 IN

## 2025-06-26 PROCEDURE — 99215 OFFICE O/P EST HI 40 MIN: CPT

## 2025-06-26 PROCEDURE — 99214 OFFICE O/P EST MOD 30 MIN: CPT

## 2025-06-26 PROCEDURE — G2211 COMPLEX E/M VISIT ADD ON: CPT | Mod: NC

## 2025-07-29 ENCOUNTER — APPOINTMENT (OUTPATIENT)
Age: 65
End: 2025-07-29

## 2025-08-01 ENCOUNTER — APPOINTMENT (OUTPATIENT)
Age: 65
End: 2025-08-01

## 2025-08-25 ENCOUNTER — APPOINTMENT (OUTPATIENT)
Age: 65
End: 2025-08-25

## 2025-08-25 LAB
AUTO BASOPHILS #: 0.01 K/UL
AUTO BASOPHILS %: 0.2 %
AUTO EOSINOPHILS #: 0.03 K/UL
AUTO EOSINOPHILS %: 0.7 %
AUTO IMMATURE GRANULOCYTES #: 0.02 K/UL
AUTO LYMPHOCYTES #: 2.51 K/UL
AUTO LYMPHOCYTES %: 55.9 %
AUTO MONOCYTES #: 0.47 K/UL
AUTO MONOCYTES %: 10.5 %
AUTO NEUTROPHILS #: 1.45 K/UL
AUTO NEUTROPHILS %: 32.3 %
AUTO NRBC #: 0 K/UL
HCT VFR BLD CALC: 30.8 %
HGB BLD-MCNC: 10.7 G/DL
IMM GRANULOCYTES NFR BLD AUTO: 0.4 %
MAN DIFF?: NORMAL
MCHC RBC-ENTMCNC: 30.5 PG
MCHC RBC-ENTMCNC: 34.7 G/DL
MCV RBC AUTO: 87.7 FL
PLATELET # BLD AUTO: 327 K/UL
PMV BLD AUTO: 0 /100 WBCS
PMV BLD: 8.4 FL
RBC # BLD: 3.51 M/UL
RBC # FLD: 12.5 %
WBC # FLD AUTO: 4.49 K/UL

## 2025-08-26 DIAGNOSIS — E87.6 HYPOKALEMIA: ICD-10-CM

## 2025-08-26 LAB
ALBUMIN SERPL ELPH-MCNC: 3.3 G/DL
ALP BLD-CCNC: 68 U/L
ALT SERPL-CCNC: 15 U/L
ANION GAP SERPL CALC-SCNC: 11 MMOL/L
AST SERPL-CCNC: 18 U/L
BILIRUB DIRECT SERPL-MCNC: <0.2 MG/DL
BILIRUB INDIRECT SERPL-MCNC: >0.1 MG/DL
BILIRUB SERPL-MCNC: 0.3 MG/DL
BUN SERPL-MCNC: 9 MG/DL
CALCIUM SERPL-MCNC: 8.4 MG/DL
CHLORIDE SERPL-SCNC: 101 MMOL/L
CO2 SERPL-SCNC: 27 MMOL/L
CREAT SERPL-MCNC: 1 MG/DL
EGFRCR SERPLBLD CKD-EPI 2021: 84 ML/MIN/1.73M2
FERRITIN SERPL-MCNC: 327 NG/ML
FOLATE SERPL-MCNC: >20 NG/ML
GLUCOSE SERPL-MCNC: 121 MG/DL
IRON SATN MFR SERPL: 22 %
IRON SERPL-MCNC: 48 UG/DL
POTASSIUM SERPL-SCNC: 2.8 MMOL/L
PROT SERPL-MCNC: 6.4 G/DL
SODIUM SERPL-SCNC: 139 MMOL/L
TIBC SERPL-MCNC: 214 UG/DL
UIBC SERPL-MCNC: 166 UG/DL

## 2025-08-26 RX ORDER — POTASSIUM CHLORIDE 1500 MG/1
20 TABLET, EXTENDED RELEASE ORAL
Qty: 6 | Refills: 0 | Status: COMPLETED | COMMUNITY
Start: 2025-08-26 | End: 2025-08-29

## 2025-08-28 ENCOUNTER — APPOINTMENT (OUTPATIENT)
Age: 65
End: 2025-08-28
Payer: COMMERCIAL

## 2025-08-28 VITALS
HEART RATE: 83 BPM | RESPIRATION RATE: 16 BRPM | SYSTOLIC BLOOD PRESSURE: 129 MMHG | OXYGEN SATURATION: 99 % | TEMPERATURE: 98.3 F | DIASTOLIC BLOOD PRESSURE: 86 MMHG | BODY MASS INDEX: 23.65 KG/M2 | WEIGHT: 151 LBS

## 2025-08-28 DIAGNOSIS — K50.90 CROHN'S DISEASE, UNSPECIFIED, W/OUT COMPLICATIONS: ICD-10-CM

## 2025-08-28 DIAGNOSIS — M48.17: ICD-10-CM

## 2025-08-28 DIAGNOSIS — D50.9 IRON DEFICIENCY ANEMIA, UNSPECIFIED: ICD-10-CM

## 2025-08-28 PROCEDURE — 99204 OFFICE O/P NEW MOD 45 MIN: CPT

## 2025-08-28 PROCEDURE — G2211 COMPLEX E/M VISIT ADD ON: CPT | Mod: NC

## 2025-08-29 ENCOUNTER — APPOINTMENT (OUTPATIENT)
Age: 65
End: 2025-08-29

## 2025-08-29 LAB
ANION GAP SERPL CALC-SCNC: 9 MMOL/L
BUN SERPL-MCNC: 12 MG/DL
CALCIUM SERPL-MCNC: 7.6 MG/DL
CHLORIDE SERPL-SCNC: 102 MMOL/L
CO2 SERPL-SCNC: 27 MMOL/L
CREAT SERPL-MCNC: 0.9 MG/DL
EGFRCR SERPLBLD CKD-EPI 2021: 95 ML/MIN/1.73M2
GLUCOSE SERPL-MCNC: 105 MG/DL
METHYLMALONATE SERPL-SCNC: 124 NMOL/L
POTASSIUM SERPL-SCNC: 3.4 MMOL/L
SODIUM SERPL-SCNC: 138 MMOL/L

## 2025-08-29 RX ORDER — POTASSIUM CHLORIDE 1500 MG/1
20 TABLET, FILM COATED, EXTENDED RELEASE ORAL
Qty: 3 | Refills: 0 | Status: ACTIVE | COMMUNITY
Start: 2025-08-29 | End: 1900-01-01